# Patient Record
Sex: FEMALE | Race: BLACK OR AFRICAN AMERICAN | NOT HISPANIC OR LATINO | Employment: FULL TIME | URBAN - METROPOLITAN AREA
[De-identification: names, ages, dates, MRNs, and addresses within clinical notes are randomized per-mention and may not be internally consistent; named-entity substitution may affect disease eponyms.]

---

## 2018-01-29 ENCOUNTER — HOSPITAL ENCOUNTER (EMERGENCY)
Facility: OTHER | Age: 53
Discharge: HOME | End: 2018-01-29
Attending: EMERGENCY MEDICINE

## 2018-01-29 VITALS
HEART RATE: 68 BPM | OXYGEN SATURATION: 99 % | WEIGHT: 214 LBS | HEIGHT: 66 IN | TEMPERATURE: 99 F | RESPIRATION RATE: 14 BRPM | BODY MASS INDEX: 34.39 KG/M2 | SYSTOLIC BLOOD PRESSURE: 127 MMHG | DIASTOLIC BLOOD PRESSURE: 74 MMHG

## 2018-01-29 DIAGNOSIS — D64.9 ANEMIA, UNSPECIFIED TYPE: ICD-10-CM

## 2018-01-29 DIAGNOSIS — N93.8 DYSFUNCTIONAL UTERINE BLEEDING: Primary | ICD-10-CM

## 2018-01-29 DIAGNOSIS — E87.6 HYPOKALEMIA: ICD-10-CM

## 2018-01-29 LAB
ALBUMIN SERPL BCP-MCNC: 3.5 G/DL
ALP SERPL-CCNC: 82 U/L
ALT SERPL W/O P-5'-P-CCNC: 12 U/L
ANION GAP SERPL CALC-SCNC: 8 MMOL/L
AST SERPL-CCNC: 15 U/L
BASOPHILS # BLD AUTO: 0.03 K/UL
BASOPHILS NFR BLD: 0.3 %
BILIRUB SERPL-MCNC: 0.2 MG/DL
BILIRUB UR QL STRIP: NEGATIVE
BUN SERPL-MCNC: 11 MG/DL
CALCIUM SERPL-MCNC: 9.2 MG/DL
CHLORIDE SERPL-SCNC: 107 MMOL/L
CLARITY UR: CLEAR
CO2 SERPL-SCNC: 25 MMOL/L
COLOR UR: YELLOW
CREAT SERPL-MCNC: 0.7 MG/DL
DIFFERENTIAL METHOD: ABNORMAL
EOSINOPHIL # BLD AUTO: 0.1 K/UL
EOSINOPHIL NFR BLD: 1.1 %
ERYTHROCYTE [DISTWIDTH] IN BLOOD BY AUTOMATED COUNT: 18.4 %
EST. GFR  (AFRICAN AMERICAN): >60 ML/MIN/1.73 M^2
EST. GFR  (NON AFRICAN AMERICAN): >60 ML/MIN/1.73 M^2
GLUCOSE SERPL-MCNC: 96 MG/DL
GLUCOSE UR QL STRIP: NEGATIVE
HCT VFR BLD AUTO: 29.2 %
HGB BLD-MCNC: 9.3 G/DL
HGB UR QL STRIP: ABNORMAL
INR PPP: 1
KETONES UR QL STRIP: NEGATIVE
LEUKOCYTE ESTERASE UR QL STRIP: NEGATIVE
LYMPHOCYTES # BLD AUTO: 2.3 K/UL
LYMPHOCYTES NFR BLD: 22.8 %
MCH RBC QN AUTO: 25.3 PG
MCHC RBC AUTO-ENTMCNC: 31.8 G/DL
MCV RBC AUTO: 79 FL
MICROSCOPIC COMMENT: ABNORMAL
MONOCYTES # BLD AUTO: 0.8 K/UL
MONOCYTES NFR BLD: 7.5 %
NEUTROPHILS # BLD AUTO: 7 K/UL
NEUTROPHILS NFR BLD: 68 %
NITRITE UR QL STRIP: NEGATIVE
PH UR STRIP: 6 [PH] (ref 5–8)
PLATELET # BLD AUTO: 248 K/UL
PMV BLD AUTO: 10.5 FL
POTASSIUM SERPL-SCNC: 3.4 MMOL/L
PROT SERPL-MCNC: 7.2 G/DL
PROT UR QL STRIP: NEGATIVE
PROTHROMBIN TIME: 11 SEC
RBC # BLD AUTO: 3.68 M/UL
RBC #/AREA URNS HPF: 10 /HPF (ref 0–4)
SODIUM SERPL-SCNC: 140 MMOL/L
SP GR UR STRIP: 1.01 (ref 1–1.03)
URN SPEC COLLECT METH UR: ABNORMAL
UROBILINOGEN UR STRIP-ACNC: NEGATIVE EU/DL
WBC # BLD AUTO: 10.25 K/UL

## 2018-01-29 PROCEDURE — 80053 COMPREHEN METABOLIC PANEL: CPT

## 2018-01-29 PROCEDURE — 85610 PROTHROMBIN TIME: CPT

## 2018-01-29 PROCEDURE — 81000 URINALYSIS NONAUTO W/SCOPE: CPT

## 2018-01-29 PROCEDURE — 85025 COMPLETE CBC W/AUTO DIFF WBC: CPT

## 2018-01-29 PROCEDURE — 99283 EMERGENCY DEPT VISIT LOW MDM: CPT

## 2018-01-30 NOTE — ED TRIAGE NOTES
"Pt presents to the ED with c/o heavy vaginal bleeding x 11 days. Pt states she still gets a period but this is not her normal bleeding. Blood is dark red with clots. Pt denies any associated symptoms but also has c/o "bruising easily" and feeling like she has a rapid heart beat. Pt has hx of anemia and takes iron daily.   "

## 2018-01-30 NOTE — ED PROVIDER NOTES
"Encounter Date: 1/29/2018    SCRIBE #1 NOTE: I, Amber Marcos, am scribing for, and in the presence of, Dr. Hernández.       History     Chief Complaint   Patient presents with    Vaginal Bleeding     x 11 days. " It's like I have a cycle then I keep spotting inbetween and then my cycle comes again". Denies dizziness, weakness or fatigue     Time seen by provider: 7:16 PM    This is a 52 y.o. female, with history of anemia, fibroids, and acid reflux, who presents with complaint of vaginal bleeding for 12 days. She used four or five pads a day the first two days, two pads a day since then, and four pads today. LMP was 22 days ago. Her normal cycle lasts 8 days. She reports bruising on her legs, and takes iron TID. She reports intermittent tingling to the LLE for one week, which occurs when at rest and not wearing her shoe. She denies fever, chills, abdominal pain, diarrhea, constipation, incontinence, dizziness, weakness, fatigue, or any urinary symptoms. She has an appointment scheduled with Dr. Ponce (OB) on 3/05.      The history is provided by the patient.     Review of patient's allergies indicates:  No Known Allergies  Past Medical History:   Diagnosis Date    Acid reflux     Anemia      History reviewed. No pertinent surgical history.  History reviewed. No pertinent family history.  Social History   Substance Use Topics    Smoking status: Not on file    Smokeless tobacco: Not on file    Alcohol use Not on file     Review of Systems   Constitutional: Negative for chills, fatigue and fever.   HENT: Negative for congestion and sore throat.    Eyes: Negative for photophobia and redness.   Respiratory: Negative for cough and shortness of breath.    Cardiovascular: Negative for chest pain.   Gastrointestinal: Negative for abdominal pain, constipation, diarrhea, nausea and vomiting.        Negative for incontinence.   Genitourinary: Positive for vaginal bleeding. Negative for decreased urine volume, difficulty " urinating, dysuria, enuresis, frequency, hematuria and urgency.   Musculoskeletal: Negative for back pain.   Skin: Negative for rash.   Neurological: Negative for dizziness, weakness, light-headedness and headaches.        Positive for tingling of LLE.   Hematological: Bruises/bleeds easily.   Psychiatric/Behavioral: Negative for confusion.       Physical Exam     Initial Vitals [01/29/18 1807]   BP Pulse Resp Temp SpO2   (!) 138/111 81 16 98.4 °F (36.9 °C) 100 %      MAP       120         Physical Exam    Nursing note and vitals reviewed.  Constitutional: She appears well-developed and well-nourished. She is not diaphoretic. No distress.   HENT:   Head: Normocephalic and atraumatic.   Mouth/Throat: Oropharynx is clear and moist.   Eyes: Conjunctivae and EOM are normal. Pupils are equal, round, and reactive to light. No scleral icterus.   Neck: Normal range of motion. Neck supple.   Cardiovascular: Normal rate, regular rhythm, S1 normal, S2 normal and normal heart sounds. Exam reveals no gallop and no friction rub.    No murmur heard.  Pulses:       Dorsalis pedis pulses are 2+ on the right side, and 2+ on the left side.        Posterior tibial pulses are 2+ on the right side, and 2+ on the left side.   Pulmonary/Chest: Breath sounds normal. No respiratory distress. She has no wheezes. She has no rhonchi. She has no rales.   Abdominal: Soft. Bowel sounds are normal. There is no tenderness. There is no rebound and no guarding.   Musculoskeletal: Normal range of motion. She exhibits no edema or tenderness.   Bilateral Lower Extremity: No edema. No palpable cord.  Back: No midline C/T/L spine tenderness to palpation, crepitus, or step offs.   Lymphadenopathy:     She has no cervical adenopathy.   Neurological: She is alert and oriented to person, place, and time.   Cranial nerves II through XII grossly intact.  5/5 motor strength all 4 extremities.  Sensation is normal.  Finger to nose normal.  Speech and cognition is  normal.  No focal neurologic deficit.  Negative posterior drawer test.   Skin: Skin is warm and dry. Capillary refill takes less than 2 seconds. No rash noted. No pallor.   Psychiatric: She has a normal mood and affect. Her behavior is normal. Judgment and thought content normal.         ED Course   Procedures  Labs Reviewed   URINALYSIS - Abnormal; Notable for the following:        Result Value    Occult Blood UA 1+ (*)     All other components within normal limits   CBC W/ AUTO DIFFERENTIAL - Abnormal; Notable for the following:     RBC 3.68 (*)     Hemoglobin 9.3 (*)     Hematocrit 29.2 (*)     MCV 79 (*)     MCH 25.3 (*)     MCHC 31.8 (*)     RDW 18.4 (*)     All other components within normal limits   COMPREHENSIVE METABOLIC PANEL - Abnormal; Notable for the following:     Potassium 3.4 (*)     All other components within normal limits   URINALYSIS MICROSCOPIC - Abnormal; Notable for the following:     RBC, UA 10 (*)     All other components within normal limits   PROTIME-INR   POCT URINE PREGNANCY             Medical Decision Making:   Clinical Tests:   Lab Tests: Ordered and Reviewed            Scribe Attestation:   Scribe #1: I performed the above scribed service and the documentation accurately describes the services I performed. I attest to the accuracy of the note.    Attending Attestation:           Physician Attestation for Scribe:  Physician Attestation Statement for Scribe #1: I, Dr. Hernández, reviewed documentation, as scribed by Amber Arriaga in my presence, and it is both accurate and complete.         Attending ED Notes:   Emergent evaluation a 52-year-old female with chief complaint of her menstrual period lasting 12 days since of her typical 8 days.  Patient states she is going through approximately 2 pads a day over the past 5-6 days.  Patient is afebrile, nontoxic-appearing with stable vital signs.  Patient is no elevation of white blood cell count.  H&H 9.3 and 29.2.  Normal platelet count.   Potassium is 3.4.  Urinary analysis reveals 1+ blood.  The patient is extensively counseled her diagnosis and treatment including all diagnostic, laboratory and physical exam findings.  The patient discharged good condition and directed to follow-up with gynecology in the next 24-48 hours.          ED Course      Clinical Impression:     1. Dysfunctional uterine bleeding    2. Anemia, unspecified type    3. Hypokalemia                               Richi Carrasquillo MD  01/29/18 2041

## 2018-03-08 ENCOUNTER — OFFICE VISIT (OUTPATIENT)
Dept: OBSTETRICS AND GYNECOLOGY | Facility: CLINIC | Age: 53
End: 2018-03-08
Payer: COMMERCIAL

## 2018-03-08 VITALS
WEIGHT: 213.88 LBS | BODY MASS INDEX: 34.37 KG/M2 | HEIGHT: 66 IN | DIASTOLIC BLOOD PRESSURE: 60 MMHG | SYSTOLIC BLOOD PRESSURE: 110 MMHG

## 2018-03-08 DIAGNOSIS — Z12.31 VISIT FOR SCREENING MAMMOGRAM: ICD-10-CM

## 2018-03-08 DIAGNOSIS — Z11.3 SCREEN FOR STD (SEXUALLY TRANSMITTED DISEASE): ICD-10-CM

## 2018-03-08 DIAGNOSIS — N92.0 MENORRHAGIA WITH REGULAR CYCLE: ICD-10-CM

## 2018-03-08 DIAGNOSIS — D25.9 UTERINE LEIOMYOMA, UNSPECIFIED LOCATION: ICD-10-CM

## 2018-03-08 DIAGNOSIS — Z01.419 ENCOUNTER FOR GYNECOLOGICAL EXAMINATION WITHOUT ABNORMAL FINDING: Primary | ICD-10-CM

## 2018-03-08 DIAGNOSIS — D64.9 ANEMIA, UNSPECIFIED TYPE: ICD-10-CM

## 2018-03-08 LAB
B-HCG UR QL: NEGATIVE
CANDIDA RRNA VAG QL PROBE: NEGATIVE
CTP QC/QA: YES
G VAGINALIS RRNA GENITAL QL PROBE: POSITIVE
T VAGINALIS RRNA GENITAL QL PROBE: NEGATIVE

## 2018-03-08 PROCEDURE — 87491 CHLMYD TRACH DNA AMP PROBE: CPT

## 2018-03-08 PROCEDURE — 99999 PR PBB SHADOW E&M-EST. PATIENT-LVL III: CPT | Mod: PBBFAC,,, | Performed by: OBSTETRICS & GYNECOLOGY

## 2018-03-08 PROCEDURE — 88175 CYTOPATH C/V AUTO FLUID REDO: CPT

## 2018-03-08 PROCEDURE — 99386 PREV VISIT NEW AGE 40-64: CPT | Mod: S$GLB,,, | Performed by: OBSTETRICS & GYNECOLOGY

## 2018-03-08 PROCEDURE — 81025 URINE PREGNANCY TEST: CPT | Mod: S$GLB,,, | Performed by: OBSTETRICS & GYNECOLOGY

## 2018-03-08 PROCEDURE — 87480 CANDIDA DNA DIR PROBE: CPT

## 2018-03-08 RX ORDER — PREDNISONE 20 MG/1
TABLET ORAL
Refills: 0 | COMMUNITY
Start: 2017-12-21 | End: 2018-05-15 | Stop reason: CLARIF

## 2018-03-08 RX ORDER — TERCONAZOLE 4 MG/G
CREAM VAGINAL
Refills: 2 | COMMUNITY
Start: 2018-03-03 | End: 2018-05-15 | Stop reason: CLARIF

## 2018-03-08 RX ORDER — PROMETHAZINE HYDROCHLORIDE AND CODEINE PHOSPHATE 6.25; 1 MG/5ML; MG/5ML
SOLUTION ORAL
Refills: 0 | COMMUNITY
Start: 2017-12-21 | End: 2018-05-15 | Stop reason: CLARIF

## 2018-03-08 RX ORDER — ONDANSETRON 4 MG/1
TABLET, ORALLY DISINTEGRATING ORAL
Refills: 0 | COMMUNITY
Start: 2017-12-21 | End: 2018-05-15 | Stop reason: CLARIF

## 2018-03-08 RX ORDER — BROMPHENIRAMINE MALEATE, PSEUDOEPHEDRINE HYDROCHLORIDE, AND DEXTROMETHORPHAN HYDROBROMIDE 2; 30; 10 MG/5ML; MG/5ML; MG/5ML
SYRUP ORAL
Refills: 0 | COMMUNITY
Start: 2017-12-21 | End: 2018-05-15 | Stop reason: CLARIF

## 2018-03-08 RX ORDER — CLINDAMYCIN HYDROCHLORIDE 300 MG/1
300 CAPSULE ORAL 3 TIMES DAILY
Refills: 0 | COMMUNITY
Start: 2017-12-19 | End: 2018-05-15 | Stop reason: CLARIF

## 2018-03-08 NOTE — PROGRESS NOTES
HISTORY OF PRESENT ILLNESS:    Karli Samuel is a 52 y.o. female, , Patient's last menstrual period was 2018.,  presents for a routine exam and has no complaints.    Past Medical History:   Diagnosis Date    Acid reflux     Anemia        History reviewed. No pertinent surgical history.    MEDICATIONS AND ALLERGIES:      Current Outpatient Prescriptions:     FERROUS SULFATE (IRON ORAL), Take by mouth., Disp: , Rfl:     predniSONE (DELTASONE) 20 MG tablet, TK 2 TS PO QD FOR 5 DAYS, Disp: , Rfl: 0    brompheniramine-pseudoeph-DM 2-30-10 mg/5 mL Syrp, TK 10ML PO   Q 4 H, Disp: , Rfl: 0    clindamycin (CLEOCIN) 300 MG capsule, Take 300 mg by mouth 3 (three) times daily., Disp: , Rfl: 0    FAMOTIDINE (PEPCID ORAL), Take by mouth., Disp: , Rfl:     ondansetron (ZOFRAN-ODT) 4 MG TbDL, DISSOLVE 1 T PO  Q 4 H, Disp: , Rfl: 0    promethazine-codeine 6.25-10 mg/5 ml (PHENERGAN WITH CODEINE) 6.25-10 mg/5 mL syrup, TK 5ML PO Q  4 TO 6 H, Disp: , Rfl: 0    terconazole (TERAZOL 7) 0.4 % Crea, INSERT 1 APPLICATORFUL INTO VAGINA EVERY NIGHT FOR 7 NIGHTS, Disp: , Rfl: 2    Review of patient's allergies indicates:   Allergen Reactions    Pcn [penicillins] Anaphylaxis       History reviewed. No pertinent family history.    Social History     Social History    Marital status: Single     Spouse name: N/A    Number of children: N/A    Years of education: N/A     Occupational History    Not on file.     Social History Main Topics    Smoking status: Never Smoker    Smokeless tobacco: Never Used    Alcohol use Not on file    Drug use: No    Sexual activity: Yes     Partners: Male     Other Topics Concern    Not on file     Social History Narrative    No narrative on file       COMPREHENSIVE GYN HISTORY:  PAP History: Denies abnormal Paps.  Infection History: Denies STDs. Denies PID.  Benign History: Denies uterine fibroids. Denies ovarian cysts. Denies endometriosis. Denies other conditions.  Cancer History:  "Denies cervical cancer. Denies uterine cancer or hyperplasia. Denies ovarian cancer. Denies vulvar cancer or pre-cancer. Denies vaginal cancer or pre-cancer. Denies breast cancer. Denies colon cancer.  Sexual Activity History: Reports currently being sexually active  Menstrual History: Monthly. Mod then light flow.   Dysmenorrhea History: Reports mild dysmenorrhea.       ROS:  GENERAL: No weight changes. No swelling. No fatigue. No fever.  CARDIOVASCULAR: No chest pain. No shortness of breath. No leg cramps.   NEUROLOGICAL: No headaches. No vision changes.  BREASTS: No pain. No lumps. No discharge.  ABDOMEN: No pain. No nausea. No vomiting. No diarrhea. No constipation.  REPRODUCTIVE: No abnormal bleeding.   VULVA: No pain. No lesions. No itching.  VAGINA: No relaxation. No itching. No odor. No discharge. No lesions.  URINARY: No incontinence. No nocturia. No frequency. No dysuria.    /60   Ht 5' 6" (1.676 m)   Wt 97 kg (213 lb 13.5 oz)   LMP 02/21/2018   BMI 34.52 kg/m²     PE:  APPEARANCE: Well nourished, well developed, in no acute distress.  AFFECT: WNL, alert and oriented x 3.  SKIN: No acne or hirsutism.  NECK: Neck symmetric, without masses or thyromegaly.  NODES: No inguinal, cervical, axillary or femoral lymph node enlargement.  CHEST: Good respiratory effort.   ABDOMEN: Soft. No tenderness or masses. No hepatosplenomegaly. No hernias.  BREASTS: Symmetrical, no skin changes, visible lesions, palpable masses or nipple discharge bilaterally.  PELVIC: External female genitalia without lesions.  Female hair distribution. Adequate perineal body, Normal urethral meatus. Vagina moist and well rugated without lesions or discharge.  No significant cystocele or rectocele present. Cervix pink without lesions, discharge or tenderness. Uterus is 4-6 week size, regular, mobile and nontender. Adnexa without masses or tenderness.  EXTREMITIES: No edema    DIAGNOSIS:  1. Encounter for gynecological examination " without abnormal finding    2. Visit for screening mammogram    3. Uterine leiomyoma, unspecified location    4. Menorrhagia with regular cycle    5. Anemia, unspecified type        PLAN:    Orders Placed This Encounter    Mammo Digital Diagnostic Bilat with CAD    US Pelvis Comp with Transvag NON-OB (xpd    Liquid-based pap smear, screening       COUNSELING:  The patient was counseled today on:  -A.C.S. Pap and pelvic exam guidelines (pap every 3 years), recomendations for yearly mammogram;  -to follow up with her PCP for other health maintenance.    FOLLOW-UP with me annually.

## 2018-03-08 NOTE — PROGRESS NOTES
HISTORY OF PRESENT ILLNESS:    Karli Samuel is a 52 y.o. female, , Patient's last menstrual period was 2018.,  presents for a routine exam   Heavy menses every danae lasting 11 days using 6 pads per day with clotting.   Recent Terazol use with continuesd irratiation     Past Medical History:   Diagnosis Date    Acid reflux     Anemia        History reviewed. No pertinent surgical history.    MEDICATIONS AND ALLERGIES:      Current Outpatient Prescriptions:     FERROUS SULFATE (IRON ORAL), Take by mouth., Disp: , Rfl:     predniSONE (DELTASONE) 20 MG tablet, TK 2 TS PO QD FOR 5 DAYS, Disp: , Rfl: 0    brompheniramine-pseudoeph-DM 2-30-10 mg/5 mL Syrp, TK 10ML PO   Q 4 H, Disp: , Rfl: 0    clindamycin (CLEOCIN) 300 MG capsule, Take 300 mg by mouth 3 (three) times daily., Disp: , Rfl: 0    FAMOTIDINE (PEPCID ORAL), Take by mouth., Disp: , Rfl:     ondansetron (ZOFRAN-ODT) 4 MG TbDL, DISSOLVE 1 T PO  Q 4 H, Disp: , Rfl: 0    promethazine-codeine 6.25-10 mg/5 ml (PHENERGAN WITH CODEINE) 6.25-10 mg/5 mL syrup, TK 5ML PO Q  4 TO 6 H, Disp: , Rfl: 0    terconazole (TERAZOL 7) 0.4 % Crea, INSERT 1 APPLICATORFUL INTO VAGINA EVERY NIGHT FOR 7 NIGHTS, Disp: , Rfl: 2    Review of patient's allergies indicates:   Allergen Reactions    Pcn [penicillins] Anaphylaxis       History reviewed. No pertinent family history.    Social History     Social History    Marital status: Single     Spouse name: N/A    Number of children: N/A    Years of education: N/A     Occupational History    Not on file.     Social History Main Topics    Smoking status: Never Smoker    Smokeless tobacco: Never Used    Alcohol use Not on file    Drug use: No    Sexual activity: Yes     Partners: Male     Other Topics Concern    Not on file     Social History Narrative    No narrative on file       COMPREHENSIVE GYN HISTORY:  PAP History: Denies abnormal Paps.  Infection History: Denies STDs. Denies PID.  Benign History: Denies  "uterine fibroids. Denies ovarian cysts. Denies endometriosis. Denies other conditions.  Cancer History: Denies cervical cancer. Denies uterine cancer or hyperplasia. Denies ovarian cancer. Denies vulvar cancer or pre-cancer. Denies vaginal cancer or pre-cancer. Denies breast cancer. Denies colon cancer.  Sexual Activity History: Reports currently being sexually active  Menstrual History: Monthly. Mod then light flow.   Dysmenorrhea History: Reports mild dysmenorrhea.       ROS:  GENERAL: No weight changes. No swelling. No fatigue. No fever.  CARDIOVASCULAR: No chest pain. No shortness of breath. No leg cramps.   NEUROLOGICAL: No headaches. No vision changes.  BREASTS: No pain. No lumps. No discharge.  ABDOMEN: No pain. No nausea. No vomiting. No diarrhea. No constipation.  REPRODUCTIVE: No abnormal bleeding.   VULVA: No pain. No lesions. No itching.  VAGINA: No relaxation. No itching. No odor. No discharge. No lesions.  URINARY: No incontinence. No nocturia. No frequency. No dysuria.    /60   Ht 5' 6" (1.676 m)   Wt 97 kg (213 lb 13.5 oz)   LMP 02/21/2018   BMI 34.52 kg/m²     PE:  APPEARANCE: Well nourished, well developed, in no acute distress.  AFFECT: WNL, alert and oriented x 3.  SKIN: No acne or hirsutism.  NECK: Neck symmetric, without masses or thyromegaly.  NODES: No inguinal, cervical, axillary or femoral lymph node enlargement.  CHEST: Good respiratory effort.   ABDOMEN: Soft. No tenderness or masses. No hepatosplenomegaly. No hernias.  BREASTS: Symmetrical, no skin changes, visible lesions, palpable masses or nipple discharge bilaterally.  PELVIC: External female genitalia without lesions.  Female hair distribution. Adequate perineal body, Normal urethral meatus. Vagina moist and well rugated without lesions or discharge.  No significant cystocele or rectocele present. Cervix pink without lesions, discharge or tenderness. Uterus is 14-16 week size, regular, mobile and nontender. Adnexa without " masses or tenderness.  EXTREMITIES: No edema    DIAGNOSIS:  1. Encounter for gynecological examination without abnormal finding    2. Visit for screening mammogram    3. Uterine leiomyoma, unspecified location    4. Menorrhagia with regular cycle    5. Anemia, unspecified type        PLAN:    Orders Placed This Encounter    Mammo Digital Diagnostic Bilat with CAD    US Pelvis Comp with Transvag NON-OB (xpd    Liquid-based pap smear, screening       COUNSELING:  The patient was counseled today on:  -A.C.S. Pap and pelvic exam guidelines (pap every 3 years), recomendations for yearly mammogram;  -to follow up with her PCP for other health maintenance.    FOLLOW-UP with me for EMBX

## 2018-03-09 ENCOUNTER — TELEPHONE (OUTPATIENT)
Dept: OBSTETRICS AND GYNECOLOGY | Facility: CLINIC | Age: 53
End: 2018-03-09

## 2018-03-09 DIAGNOSIS — B96.89 BV (BACTERIAL VAGINOSIS): Primary | ICD-10-CM

## 2018-03-09 DIAGNOSIS — N76.0 BV (BACTERIAL VAGINOSIS): Primary | ICD-10-CM

## 2018-03-09 LAB
C TRACH DNA SPEC QL NAA+PROBE: NOT DETECTED
N GONORRHOEA DNA SPEC QL NAA+PROBE: NOT DETECTED

## 2018-03-09 RX ORDER — METRONIDAZOLE 7.5 MG/G
1 GEL VAGINAL DAILY
Qty: 1 G | Refills: 0 | Status: SHIPPED | OUTPATIENT
Start: 2018-03-09 | End: 2018-03-16

## 2018-03-09 NOTE — TELEPHONE ENCOUNTER
I spoke to the pt and informed her that she has a over growth of bacteria and she  Has medicine at the pharmacy for  per Dr Ponce.

## 2018-03-19 ENCOUNTER — HOSPITAL ENCOUNTER (OUTPATIENT)
Dept: RADIOLOGY | Facility: OTHER | Age: 53
Discharge: HOME | End: 2018-03-19
Attending: OBSTETRICS & GYNECOLOGY
Payer: COMMERCIAL

## 2018-03-19 DIAGNOSIS — N92.0 MENORRHAGIA WITH REGULAR CYCLE: ICD-10-CM

## 2018-03-19 DIAGNOSIS — Z12.31 VISIT FOR SCREENING MAMMOGRAM: ICD-10-CM

## 2018-03-19 DIAGNOSIS — D64.9 ANEMIA, UNSPECIFIED TYPE: ICD-10-CM

## 2018-03-19 PROCEDURE — 76856 US EXAM PELVIC COMPLETE: CPT | Mod: 26,,, | Performed by: RADIOLOGY

## 2018-03-19 PROCEDURE — 76830 TRANSVAGINAL US NON-OB: CPT | Mod: 26,,, | Performed by: RADIOLOGY

## 2018-03-19 PROCEDURE — 77067 SCR MAMMO BI INCL CAD: CPT | Mod: 26,,, | Performed by: RADIOLOGY

## 2018-03-19 PROCEDURE — 77067 SCR MAMMO BI INCL CAD: CPT | Mod: TC

## 2018-03-19 PROCEDURE — 76830 TRANSVAGINAL US NON-OB: CPT | Mod: TC

## 2018-03-19 PROCEDURE — 77063 BREAST TOMOSYNTHESIS BI: CPT | Mod: 26,,, | Performed by: RADIOLOGY

## 2018-04-05 ENCOUNTER — PROCEDURE VISIT (OUTPATIENT)
Dept: OBSTETRICS AND GYNECOLOGY | Facility: CLINIC | Age: 53
End: 2018-04-05
Payer: COMMERCIAL

## 2018-04-05 VITALS
DIASTOLIC BLOOD PRESSURE: 80 MMHG | BODY MASS INDEX: 35.43 KG/M2 | HEIGHT: 66 IN | WEIGHT: 220.44 LBS | SYSTOLIC BLOOD PRESSURE: 120 MMHG

## 2018-04-05 DIAGNOSIS — N93.8 DUB (DYSFUNCTIONAL UTERINE BLEEDING): Primary | ICD-10-CM

## 2018-04-05 DIAGNOSIS — N89.8 VAGINAL DISCHARGE: ICD-10-CM

## 2018-04-05 LAB
B-HCG UR QL: NEGATIVE
CTP QC/QA: YES

## 2018-04-05 PROCEDURE — 87480 CANDIDA DNA DIR PROBE: CPT

## 2018-04-05 PROCEDURE — 81025 URINE PREGNANCY TEST: CPT | Mod: S$GLB,,, | Performed by: OBSTETRICS & GYNECOLOGY

## 2018-04-05 PROCEDURE — 87510 GARDNER VAG DNA DIR PROBE: CPT

## 2018-04-06 ENCOUNTER — PATIENT MESSAGE (OUTPATIENT)
Dept: OBSTETRICS AND GYNECOLOGY | Facility: CLINIC | Age: 53
End: 2018-04-06

## 2018-04-06 ENCOUNTER — TELEPHONE (OUTPATIENT)
Dept: OBSTETRICS AND GYNECOLOGY | Facility: CLINIC | Age: 53
End: 2018-04-06

## 2018-04-06 DIAGNOSIS — B96.89 BV (BACTERIAL VAGINOSIS): Primary | ICD-10-CM

## 2018-04-06 DIAGNOSIS — N76.0 BV (BACTERIAL VAGINOSIS): Primary | ICD-10-CM

## 2018-04-06 LAB
CANDIDA RRNA VAG QL PROBE: NEGATIVE
G VAGINALIS RRNA GENITAL QL PROBE: POSITIVE
T VAGINALIS RRNA GENITAL QL PROBE: NEGATIVE

## 2018-04-06 RX ORDER — METRONIDAZOLE 7.5 MG/G
1 GEL VAGINAL DAILY
Qty: 1 G | Refills: 0 | Status: SHIPPED | OUTPATIENT
Start: 2018-04-06 | End: 2018-04-13

## 2018-04-26 ENCOUNTER — PROCEDURE VISIT (OUTPATIENT)
Dept: OBSTETRICS AND GYNECOLOGY | Facility: CLINIC | Age: 53
End: 2018-04-26
Payer: COMMERCIAL

## 2018-04-26 VITALS
SYSTOLIC BLOOD PRESSURE: 110 MMHG | WEIGHT: 212 LBS | DIASTOLIC BLOOD PRESSURE: 70 MMHG | BODY MASS INDEX: 34.07 KG/M2 | HEIGHT: 66 IN

## 2018-04-26 DIAGNOSIS — D25.9 UTERINE LEIOMYOMA, UNSPECIFIED LOCATION: ICD-10-CM

## 2018-04-26 DIAGNOSIS — N93.8 DUB (DYSFUNCTIONAL UTERINE BLEEDING): Primary | ICD-10-CM

## 2018-04-26 LAB
B-HCG UR QL: NEGATIVE
CTP QC/QA: YES

## 2018-04-26 PROCEDURE — 88305 TISSUE EXAM BY PATHOLOGIST: CPT | Mod: 26,,,

## 2018-04-26 PROCEDURE — 81025 URINE PREGNANCY TEST: CPT | Mod: S$GLB,,, | Performed by: OBSTETRICS & GYNECOLOGY

## 2018-04-26 PROCEDURE — 88305 TISSUE EXAM BY PATHOLOGIST: CPT

## 2018-04-26 PROCEDURE — 58100 BIOPSY OF UTERUS LINING: CPT | Mod: S$GLB,,, | Performed by: OBSTETRICS & GYNECOLOGY

## 2018-04-26 RX ORDER — MELOXICAM 7.5 MG/1
TABLET ORAL
Refills: 1 | COMMUNITY
Start: 2018-04-05 | End: 2018-05-15 | Stop reason: CLARIF

## 2018-04-26 NOTE — PROCEDURES
Biopsy (Gynecological)  Date/Time: 4/26/2018 12:34 PM  Performed by: ELMO COUGHLIN  Authorized by: ELMO COUGHLIN     Consent Done?:  Yes (Written)   Patient was prepped and draped in the normal sterile fashion.  Local anesthesia used?: No      Biopsy Location:  Uterus  Estimated blood loss (cc):  10   Patient tolerated the procedure well with no immediate complications.     Pelvic rest for 2 weeks. Bleeding, pain and fever precautions given.

## 2018-05-15 ENCOUNTER — TELEPHONE (OUTPATIENT)
Dept: OBSTETRICS AND GYNECOLOGY | Facility: CLINIC | Age: 53
End: 2018-05-15

## 2018-05-15 ENCOUNTER — ANESTHESIA EVENT (OUTPATIENT)
Dept: SURGERY | Facility: OTHER | Age: 53
End: 2018-05-15
Payer: COMMERCIAL

## 2018-05-15 ENCOUNTER — HOSPITAL ENCOUNTER (OUTPATIENT)
Dept: PREADMISSION TESTING | Facility: OTHER | Age: 53
Discharge: HOME | End: 2018-05-15
Attending: OBSTETRICS & GYNECOLOGY
Payer: COMMERCIAL

## 2018-05-15 ENCOUNTER — OFFICE VISIT (OUTPATIENT)
Dept: OBSTETRICS AND GYNECOLOGY | Facility: CLINIC | Age: 53
End: 2018-05-15
Payer: COMMERCIAL

## 2018-05-15 VITALS
WEIGHT: 222 LBS | BODY MASS INDEX: 35.68 KG/M2 | HEIGHT: 66 IN | DIASTOLIC BLOOD PRESSURE: 88 MMHG | SYSTOLIC BLOOD PRESSURE: 132 MMHG

## 2018-05-15 VITALS
DIASTOLIC BLOOD PRESSURE: 79 MMHG | HEIGHT: 66 IN | SYSTOLIC BLOOD PRESSURE: 130 MMHG | TEMPERATURE: 98 F | OXYGEN SATURATION: 99 % | BODY MASS INDEX: 35.68 KG/M2 | WEIGHT: 222 LBS | HEART RATE: 69 BPM

## 2018-05-15 DIAGNOSIS — D64.9 ANEMIA, UNSPECIFIED TYPE: ICD-10-CM

## 2018-05-15 DIAGNOSIS — Z87.42 HISTORY OF VAGINITIS: Primary | ICD-10-CM

## 2018-05-15 DIAGNOSIS — D25.9 UTERINE LEIOMYOMA, UNSPECIFIED LOCATION: ICD-10-CM

## 2018-05-15 DIAGNOSIS — Z01.818 PRE-OP EXAMINATION: ICD-10-CM

## 2018-05-15 DIAGNOSIS — N92.0 MENORRHAGIA WITH REGULAR CYCLE: ICD-10-CM

## 2018-05-15 LAB
ABO + RH BLD: NORMAL
ANION GAP SERPL CALC-SCNC: 10 MMOL/L
BLD GP AB SCN CELLS X3 SERPL QL: NORMAL
BUN SERPL-MCNC: 7 MG/DL
CALCIUM SERPL-MCNC: 8.9 MG/DL
CANDIDA RRNA VAG QL PROBE: NEGATIVE
CHLORIDE SERPL-SCNC: 108 MMOL/L
CO2 SERPL-SCNC: 23 MMOL/L
CREAT SERPL-MCNC: 0.6 MG/DL
EST. GFR  (AFRICAN AMERICAN): >60 ML/MIN/1.73 M^2
EST. GFR  (NON AFRICAN AMERICAN): >60 ML/MIN/1.73 M^2
G VAGINALIS RRNA GENITAL QL PROBE: POSITIVE
GLUCOSE SERPL-MCNC: 84 MG/DL
POTASSIUM SERPL-SCNC: 3.7 MMOL/L
SODIUM SERPL-SCNC: 141 MMOL/L
T VAGINALIS RRNA GENITAL QL PROBE: NEGATIVE

## 2018-05-15 PROCEDURE — 87480 CANDIDA DNA DIR PROBE: CPT

## 2018-05-15 PROCEDURE — 87510 GARDNER VAG DNA DIR PROBE: CPT

## 2018-05-15 PROCEDURE — 3008F BODY MASS INDEX DOCD: CPT | Mod: CPTII,S$GLB,, | Performed by: OBSTETRICS & GYNECOLOGY

## 2018-05-15 PROCEDURE — 86850 RBC ANTIBODY SCREEN: CPT

## 2018-05-15 PROCEDURE — 87491 CHLMYD TRACH DNA AMP PROBE: CPT

## 2018-05-15 PROCEDURE — 80048 BASIC METABOLIC PNL TOTAL CA: CPT

## 2018-05-15 PROCEDURE — 93010 ELECTROCARDIOGRAM REPORT: CPT | Mod: ,,, | Performed by: INTERNAL MEDICINE

## 2018-05-15 PROCEDURE — 93005 ELECTROCARDIOGRAM TRACING: CPT

## 2018-05-15 PROCEDURE — 99999 PR PBB SHADOW E&M-EST. PATIENT-LVL II: CPT | Mod: PBBFAC,,, | Performed by: OBSTETRICS & GYNECOLOGY

## 2018-05-15 PROCEDURE — 99213 OFFICE O/P EST LOW 20 MIN: CPT | Mod: S$GLB,,, | Performed by: OBSTETRICS & GYNECOLOGY

## 2018-05-15 RX ORDER — LIDOCAINE HYDROCHLORIDE 10 MG/ML
0.5 INJECTION, SOLUTION EPIDURAL; INFILTRATION; INTRACAUDAL; PERINEURAL ONCE
Status: CANCELLED | OUTPATIENT
Start: 2018-05-15 | End: 2018-05-15

## 2018-05-15 RX ORDER — SODIUM CHLORIDE, SODIUM LACTATE, POTASSIUM CHLORIDE, CALCIUM CHLORIDE 600; 310; 30; 20 MG/100ML; MG/100ML; MG/100ML; MG/100ML
INJECTION, SOLUTION INTRAVENOUS CONTINUOUS
Status: CANCELLED | OUTPATIENT
Start: 2018-05-15

## 2018-05-15 RX ORDER — MIDAZOLAM HYDROCHLORIDE 5 MG/ML
5 INJECTION INTRAMUSCULAR; INTRAVENOUS
Status: DISCONTINUED | OUTPATIENT
Start: 2018-05-25 | End: 2018-05-16 | Stop reason: HOSPADM

## 2018-05-15 NOTE — ANESTHESIA PREPROCEDURE EVALUATION
05/15/2018  Karli Samuel is a 53 y.o., female.    Anesthesia Evaluation    I have reviewed the Patient Summary Reports.    I have reviewed the Nursing Notes.   I have reviewed the Medications.     Review of Systems  Anesthesia Hx:  Denies Family Hx of Anesthesia complications.   Denies Personal Hx of Anesthesia complications.   Hematology/Oncology:     Oncology Normal    -- Anemia: Hematology Comments: H/h 9/29    Cardiovascular:   Exercise tolerance: good Valvular problems/Murmurs (H/O Palpitations, no meds)    Renal/:  Renal/ Normal     Hepatic/GI:   GERD    Neurological:  Neurology Normal    Endocrine:  Endocrine Normal        Physical Exam  General:  Well nourished, Obesity    Airway/Jaw/Neck:  Airway Findings: Mouth Opening: Normal Tongue: Normal  General Airway Assessment: Adult  Mallampati: II  TM Distance: Normal, at least 6 cm      Dental:  Dental Findings: In tact, upper front caps        Mental Status:  Mental Status Findings:  Alert and Oriented, Cooperative         Anesthesia Plan  Type of Anesthesia, risks & benefits discussed:  Anesthesia Type:  general  Patient's Preference:   Intra-op Monitoring Plan: standard ASA monitors  Intra-op Monitoring Plan Comments:   Post Op Pain Control Plan: multimodal analgesia  Post Op Pain Control Plan Comments:   Induction:   IV  Beta Blocker:         Informed Consent: Patient understands risks and agrees with Anesthesia plan.  Questions answered. Anesthesia consent signed with patient.  ASA Score: 2     Day of Surgery Review of History & Physical:    H&P update referred to the surgeon.     Anesthesia Plan Notes: Pt saw cardiology at [facility] about one month ago for pre-op eval.  No issues.  Will order labs and EKG        Ready For Surgery From Anesthesia Perspective.

## 2018-05-15 NOTE — TELEPHONE ENCOUNTER
Lucero, this is Kenzie I've received your request I'm returning your call from the [facility] at [facility] [place] by [facility]. I just wanted to let you know that I'm working on getting an answer for you by today.  I left a message for the pt to call the office back to inform her that she does not have to have a blood transfusion before surgery .

## 2018-05-15 NOTE — PROGRESS NOTES
HISTORY OF PRESENT ILLNESS:    Karli Samuel is a 53 y.o. female, , Patient's last menstrual period was 2018.,  presents for a pre-op exam for TLH/BS with ovarian conservation scheduled on 2018.      Patient presents with a long history of menorrhagia and pelvic pain. Heavy menses every month lasting 11 days using 6 pads per day with clotting.    Patient counseled in detail on options available for menorrhagia and uterine fibroids. Medical therapy and surgical options discussed in detail. Patient desires to proceed with definitive therapy with hysterectomy. A discussion was also held in reference to ovarian hormone production, a woman's lifetime risk of developing ovarian cancer and the possible need for reoperation for ovarian pathology. She desires NOT to have ovaries removed at the time of surgery.    Long discussion held patient today concerning her surgery, hospital course on the day of surgery and post operative course. Precautions after surgery discussed in detail.  Risks, alternatives and benefits of surgery discussed with patient in detail and consent explained in detail. Questions were answered and patient voices understanding. Plan pre-op with Anesthesia at [facility].      Pelvic US:   HISTORY:  Menorrhagia with regular cycle. Patient is pre-menopausal, age of 52.    TECHNIQUE: Transabdominal and transvaginal ultrasound of the pelvis with color flow Doppler evaluation of the ovaries.    COMPARISON: None.    FINDINGS:   Uterus:        Size: 13 x 4 x 5.8 x 6.8 cm         Appearance: Heterogeneous with multiple, at least 5 fibroids. The largest measures 4.0 cm along the posterior uterine body. Fibroids distort and mildly vascular the endometrium. No pedunculated fibroids.       Endometrial stripe: 5 mm    Right ovary: Not able to be visualized.    Left ovary: Not able to be visualized.    Other: No free fluid.  Impression: Multiple uterine fibroids. Nonvisualization of  the ovaries.    Electronically signed by: GRECIA ASHRAF  Date: 03/19/18 Time: 09:32     Past Medical History:   Diagnosis Date    Acid reflux     Anemia      History reviewed. No pertinent surgical history.    MEDICATIONS AND ALLERGIES:      Current Outpatient Prescriptions:     FERROUS SULFATE (IRON ORAL), Take by mouth., Disp: , Rfl:     brompheniramine-pseudoeph-DM 2-30-10 mg/5 mL Syrp, TK 10ML PO   Q 4 H, Disp: , Rfl: 0    clindamycin (CLEOCIN) 300 MG capsule, Take 300 mg by mouth 3 (three) times daily., Disp: , Rfl: 0    FAMOTIDINE (PEPCID ORAL), Take by mouth., Disp: , Rfl:     meloxicam (MOBIC) 7.5 MG tablet, TK 1 T PO BID, Disp: , Rfl: 1    ondansetron (ZOFRAN-ODT) 4 MG TbDL, DISSOLVE 1 T PO  Q 4 H, Disp: , Rfl: 0    predniSONE (DELTASONE) 20 MG tablet, TK 2 TS PO QD FOR 5 DAYS, Disp: , Rfl: 0    promethazine-codeine 6.25-10 mg/5 ml (PHENERGAN WITH CODEINE) 6.25-10 mg/5 mL syrup, TK 5ML PO Q  4 TO 6 H, Disp: , Rfl: 0    terconazole (TERAZOL 7) 0.4 % Crea, INSERT 1 APPLICATORFUL INTO VAGINA EVERY NIGHT FOR 7 NIGHTS, Disp: , Rfl: 2    Review of patient's allergies indicates:   Allergen Reactions    Pcn [penicillins] Anaphylaxis       History reviewed. No pertinent family history.    Social History     Social History    Marital status: Single     Spouse name: N/A    Number of children: N/A    Years of education: N/A     Occupational History    Not on file.     Social History Main Topics    Smoking status: Never Smoker    Smokeless tobacco: Never Used    Alcohol use Not on file    Drug use: No    Sexual activity: Yes     Partners: Male     Other Topics Concern    Not on file     Social History Narrative    No narrative on file       COMPREHENSIVE GYN HISTORY:  PAP History: Denies abnormal Paps.  Infection History: Denies STDs. Denies PID.  Benign History: Denies uterine fibroids. Denies ovarian cysts. Denies endometriosis. Denies other conditions.  Cancer History: Denies cervical cancer.  "Denies uterine cancer or hyperplasia. Denies ovarian cancer. Denies vulvar cancer or pre-cancer. Denies vaginal cancer or pre-cancer. Denies breast cancer. Denies colon cancer.  Sexual Activity History: Reports currently being sexually active  Menstrual History: Monthly. Mod then light flow.   Dysmenorrhea History: Reports mild dysmenorrhea.     ROS:  GENERAL: No weight changes. No swelling. No fatigue. No fever.  CARDIOVASCULAR: No chest pain. No shortness of breath. No leg cramps.   NEUROLOGICAL: No headaches. No vision changes.  BREASTS: No pain. No lumps. No discharge.  ABDOMEN: No pain. No nausea. No vomiting. No diarrhea. No constipation.  REPRODUCTIVE: No abnormal bleeding.   VULVA: No pain. No lesions. No itching.  VAGINA: No relaxation. No itching. No odor. No discharge. No lesions.  URINARY: No incontinence. No nocturia. No frequency. No dysuria.    /88   Ht 5' 6" (1.676 m)   Wt 100.7 kg (222 lb 0.1 oz)   LMP 05/06/2018   BMI 35.83 kg/m²     PE:  APPEARANCE: Well nourished, well developed, in no acute distress.  AFFECT: WNL, alert and oriented x 3.  SKIN: No acne or hirsutism.  NECK: Neck symmetric, without masses or thyromegaly.  NODES: No inguinal, cervical, axillary or femoral lymph node enlargement.  CHEST: Good respiratory effort.   ABDOMEN: Soft. No tenderness or masses. No hepatosplenomegaly. No hernias.  BREASTS: Symmetrical, no skin changes, visible lesions, palpable masses or nipple discharge bilaterally.  PELVIC: External female genitalia without lesions.  Female hair distribution. Adequate perineal body, Normal urethral meatus. Vagina moist and well rugated without lesions or discharge.  No significant cystocele or rectocele present. Cervix pink without lesions, discharge or tenderness. Uterus is 12 week size, regular, mobile and nontender. Adnexa without masses or tenderness.  EXTREMITIES: No edema    DIAGNOSIS:  1. History of vaginitis    2. Menorrhagia with regular cycle    3. " Uterine leiomyoma, unspecified location    4. Anemia, unspecified type      PLAN:    Orders Placed This Encounter    Vaginosis Screen by DNA Probe    C. trachomatis/N. gonorrhoeae by AMP DNA Cervix    C. trachomatis/N. gonorrhoeae by AMP DNA Cervix    Vaginosis Screen by DNA Probe    CBC auto differential     LABS AND TESTS ORDERED:  Pre-op orders and T&S ordered    COUNSELING:  Post op counseling performed, questions answered    Plan TLH/BS with ovarian conservation/Cystoscopy on 5/25/2018.     FOLLOW-UP with me for post op visit.

## 2018-05-15 NOTE — DISCHARGE INSTRUCTIONS
PRE-ADMIT TESTING -  [phone]    [address]  [facility]          Your surgery has been scheduled at [facility]. We are pleased to have the opportunity to serve you. For Further Information please call [phone].    On the day of surgery please report to the Information Desk on the 1st floor.    · CONTACT YOUR PHYSICIAN'S OFFICE THE DAY PRIOR TO YOUR SURGERY TO OBTAIN YOUR ARRIVAL TIME.     · The evening before surgery do not eat anything after 9 p.m. ( this includes hard candy, chewing gum and mints).  You may only have GATORADE, POWERADE AND WATER  from 9 p.m. until you leave your home.   DO NOT DRINK ANY LIQUIDS ON THE WAY TO THE HOSPITAL.      SPECIAL MEDICATION INSTRUCTIONS: TAKE medications checked off by the Anesthesiologist on your Medication List.    Angiogram Patients: Take medications as instructed by your physician, including aspirin.     Surgery Patients:    If you take ASPIRIN - Your PHYSICIAN/SURGEON will need to inform you IF/OR when you need to stop taking aspirin prior to your surgery.     Do Not take any medications containing IBUPROFEN.  Do Not Wear any make-up or dark nail polish   (especially eye make-up) to surgery. If you come to surgery with makeup on you will be required to remove the makeup or nail polish.    Do not shave your surgical area at least 5 days prior to your surgery. The surgical prep will be performed at the hospital according to Infection Control regulations.    Leave all valuables at home.   Do Not wear any jewelry or watches, including any metal in body piercings.  Contact Lens must be removed before surgery. Either do not wear the contact lens or bring a case and solution for storage.  Please bring a container for eyeglasses or dentures as required.  Bring any paperwork your physician has provided, such as consent forms,  history and physicals, doctor's orders, etc.   Bring comfortable clothes that are loose fitting to wear upon  discharge. Take into consideration the type of surgery being performed.  Maintain your diet as advised per your physician the day prior to surgery.      Adequate rest the night before surgery is advised.   Park in the Parking lot behind the hospital or in the [facility] across the street from the parking lot. Parking is complimentary.  If you will be discharged the same day as your procedure, please arrange for a responsible adult to drive you home or to accompany you if traveling by taxi.   YOU WILL NOT BE PERMITTED TO DRIVE OR TO LEAVE THE HOSPITAL ALONE AFTER SURGERY.   It is strongly recommended that you arrange for someone to remain with you for the first 24 hrs following your surgery.       Thank you for your cooperation.  The Staff of [facility].        Bathing Instructions                                                                 Please shower the evening before and morning of your procedure with    ANTIBACTERIAL SOAP. ( DIAL, etc )  Concentrate on the surgical area   for at least 3 minutes and rinse completely. Dry off as usual.   Do not use any deodorant, powder, body lotions, perfume, after shave or    cologne.

## 2018-05-15 NOTE — TELEPHONE ENCOUNTER
----- Message from Talia Ponce MD sent at 5/15/2018  3:01 PM CDT -----  Please inform patient that H&H is 10.6/ 32.3.   She will not need preoperative transfusion.   Please also wish her a happy time next week. :-)

## 2018-05-15 NOTE — TELEPHONE ENCOUNTER
I spoke to the pt and informed her that she does not have to have a blood transfusion per Dr Ponce.

## 2018-05-16 LAB
C TRACH DNA SPEC QL NAA+PROBE: NOT DETECTED
N GONORRHOEA DNA SPEC QL NAA+PROBE: NOT DETECTED

## 2018-05-18 RX ORDER — METRONIDAZOLE 7.5 MG/G
GEL TOPICAL 2 TIMES DAILY
COMMUNITY
End: 2018-05-18 | Stop reason: SDUPTHER

## 2018-05-18 RX ORDER — METRONIDAZOLE 7.5 MG/G
GEL TOPICAL 2 TIMES DAILY
Qty: 1 TUBE | Refills: 0 | Status: SHIPPED | OUTPATIENT
Start: 2018-05-18 | End: 2018-06-04

## 2018-05-23 ENCOUNTER — TELEPHONE (OUTPATIENT)
Dept: OBSTETRICS AND GYNECOLOGY | Facility: CLINIC | Age: 53
End: 2018-05-23

## 2018-05-23 NOTE — TELEPHONE ENCOUNTER
I spoke to the pt and informed that she needs to take the medicine for a bacteria infection that she has.

## 2018-05-23 NOTE — TELEPHONE ENCOUNTER
----- Message from Vinayak Keller sent at 5/23/2018  2:09 PM CDT -----  Pt returning your call [phone]

## 2018-05-24 ENCOUNTER — TELEPHONE (OUTPATIENT)
Dept: OBSTETRICS AND GYNECOLOGY | Facility: CLINIC | Age: 53
End: 2018-05-24

## 2018-05-25 ENCOUNTER — ANESTHESIA (OUTPATIENT)
Dept: SURGERY | Facility: OTHER | Age: 53
End: 2018-05-25
Payer: COMMERCIAL

## 2018-05-25 ENCOUNTER — HOSPITAL ENCOUNTER (OUTPATIENT)
Facility: OTHER | Age: 53
Discharge: HOME | End: 2018-05-26
Attending: OBSTETRICS & GYNECOLOGY | Admitting: OBSTETRICS & GYNECOLOGY
Payer: COMMERCIAL

## 2018-05-25 ENCOUNTER — SURGERY (OUTPATIENT)
Age: 53
End: 2018-05-25

## 2018-05-25 VITALS — RESPIRATION RATE: 10 BRPM | HEART RATE: 69 BPM

## 2018-05-25 DIAGNOSIS — Z90.710 S/P LAPAROSCOPIC HYSTERECTOMY: Primary | ICD-10-CM

## 2018-05-25 DIAGNOSIS — N92.0 MENORRHAGIA: ICD-10-CM

## 2018-05-25 LAB
B-HCG UR QL: NEGATIVE
CTP QC/QA: YES
POCT GLUCOSE: 78 MG/DL (ref 70–110)

## 2018-05-25 PROCEDURE — 25000242 PHARM REV CODE 250 ALT 637 W/ HCPCS: Performed by: NURSE ANESTHETIST, CERTIFIED REGISTERED

## 2018-05-25 PROCEDURE — 25000003 PHARM REV CODE 250: Performed by: OBSTETRICS & GYNECOLOGY

## 2018-05-25 PROCEDURE — 27201423 OPTIME MED/SURG SUP & DEVICES STERILE SUPPLY: Performed by: OBSTETRICS & GYNECOLOGY

## 2018-05-25 PROCEDURE — 37000008 HC ANESTHESIA 1ST 15 MINUTES: Performed by: OBSTETRICS & GYNECOLOGY

## 2018-05-25 PROCEDURE — 99900035 HC TECH TIME PER 15 MIN (STAT)

## 2018-05-25 PROCEDURE — S0077 INJECTION, CLINDAMYCIN PHOSP: HCPCS | Performed by: OBSTETRICS & GYNECOLOGY

## 2018-05-25 PROCEDURE — 25000003 PHARM REV CODE 250: Performed by: STUDENT IN AN ORGANIZED HEALTH CARE EDUCATION/TRAINING PROGRAM

## 2018-05-25 PROCEDURE — 63600175 PHARM REV CODE 636 W HCPCS: Performed by: ANESTHESIOLOGY

## 2018-05-25 PROCEDURE — 63600175 PHARM REV CODE 636 W HCPCS: Performed by: NURSE ANESTHETIST, CERTIFIED REGISTERED

## 2018-05-25 PROCEDURE — 88307 TISSUE EXAM BY PATHOLOGIST: CPT | Mod: 26,,, | Performed by: PATHOLOGY

## 2018-05-25 PROCEDURE — 63600175 PHARM REV CODE 636 W HCPCS: Performed by: OBSTETRICS & GYNECOLOGY

## 2018-05-25 PROCEDURE — 94761 N-INVAS EAR/PLS OXIMETRY MLT: CPT

## 2018-05-25 PROCEDURE — P9045 ALBUMIN (HUMAN), 5%, 250 ML: HCPCS | Mod: JG | Performed by: NURSE ANESTHETIST, CERTIFIED REGISTERED

## 2018-05-25 PROCEDURE — 37000009 HC ANESTHESIA EA ADD 15 MINS: Performed by: OBSTETRICS & GYNECOLOGY

## 2018-05-25 PROCEDURE — 36000711: Performed by: OBSTETRICS & GYNECOLOGY

## 2018-05-25 PROCEDURE — 63600175 PHARM REV CODE 636 W HCPCS: Mod: JG | Performed by: NURSE ANESTHETIST, CERTIFIED REGISTERED

## 2018-05-25 PROCEDURE — 88307 TISSUE EXAM BY PATHOLOGIST: CPT | Performed by: PATHOLOGY

## 2018-05-25 PROCEDURE — 58573 TLH W/T/O UTERUS OVER 250 G: CPT | Mod: ,,, | Performed by: OBSTETRICS & GYNECOLOGY

## 2018-05-25 PROCEDURE — 82962 GLUCOSE BLOOD TEST: CPT | Performed by: OBSTETRICS & GYNECOLOGY

## 2018-05-25 PROCEDURE — 25000003 PHARM REV CODE 250: Performed by: NURSE ANESTHETIST, CERTIFIED REGISTERED

## 2018-05-25 PROCEDURE — 27000221 HC OXYGEN, UP TO 24 HOURS

## 2018-05-25 PROCEDURE — 36000710: Performed by: OBSTETRICS & GYNECOLOGY

## 2018-05-25 PROCEDURE — 71000033 HC RECOVERY, INTIAL HOUR: Performed by: OBSTETRICS & GYNECOLOGY

## 2018-05-25 PROCEDURE — 25000003 PHARM REV CODE 250: Performed by: ANESTHESIOLOGY

## 2018-05-25 PROCEDURE — 81025 URINE PREGNANCY TEST: CPT | Performed by: ANESTHESIOLOGY

## 2018-05-25 RX ORDER — BISACODYL 10 MG
10 SUPPOSITORY, RECTAL RECTAL DAILY PRN
Status: DISCONTINUED | OUTPATIENT
Start: 2018-05-25 | End: 2018-05-26 | Stop reason: HOSPADM

## 2018-05-25 RX ORDER — ALBUMIN HUMAN 50 G/1000ML
SOLUTION INTRAVENOUS CONTINUOUS PRN
Status: DISCONTINUED | OUTPATIENT
Start: 2018-05-25 | End: 2018-05-25

## 2018-05-25 RX ORDER — MEPERIDINE HYDROCHLORIDE 50 MG/ML
12.5 INJECTION INTRAMUSCULAR; INTRAVENOUS; SUBCUTANEOUS ONCE AS NEEDED
Status: DISCONTINUED | OUTPATIENT
Start: 2018-05-25 | End: 2018-05-25 | Stop reason: HOSPADM

## 2018-05-25 RX ORDER — PROMETHAZINE HYDROCHLORIDE 25 MG/ML
6.25 INJECTION, SOLUTION INTRAMUSCULAR; INTRAVENOUS
Status: DISCONTINUED | OUTPATIENT
Start: 2018-05-25 | End: 2018-05-25

## 2018-05-25 RX ORDER — DIPHENHYDRAMINE HYDROCHLORIDE 50 MG/ML
25 INJECTION INTRAMUSCULAR; INTRAVENOUS EVERY 4 HOURS PRN
Status: DISCONTINUED | OUTPATIENT
Start: 2018-05-25 | End: 2018-05-26 | Stop reason: HOSPADM

## 2018-05-25 RX ORDER — PROMETHAZINE HYDROCHLORIDE 25 MG/ML
INJECTION, SOLUTION INTRAMUSCULAR; INTRAVENOUS
Status: DISPENSED
Start: 2018-05-25 | End: 2018-05-26

## 2018-05-25 RX ORDER — SODIUM CHLORIDE 0.9 % (FLUSH) 0.9 %
3 SYRINGE (ML) INJECTION
Status: DISCONTINUED | OUTPATIENT
Start: 2018-05-25 | End: 2018-05-26 | Stop reason: HOSPADM

## 2018-05-25 RX ORDER — LIDOCAINE HCL/PF 100 MG/5ML
SYRINGE (ML) INTRAVENOUS
Status: DISCONTINUED | OUTPATIENT
Start: 2018-05-25 | End: 2018-05-25

## 2018-05-25 RX ORDER — MIDAZOLAM HYDROCHLORIDE 1 MG/ML
INJECTION INTRAMUSCULAR; INTRAVENOUS
Status: DISCONTINUED | OUTPATIENT
Start: 2018-05-25 | End: 2018-05-25

## 2018-05-25 RX ORDER — SODIUM CHLORIDE, SODIUM LACTATE, POTASSIUM CHLORIDE, CALCIUM CHLORIDE 600; 310; 30; 20 MG/100ML; MG/100ML; MG/100ML; MG/100ML
INJECTION, SOLUTION INTRAVENOUS CONTINUOUS
Status: DISCONTINUED | OUTPATIENT
Start: 2018-05-25 | End: 2018-05-25

## 2018-05-25 RX ORDER — OXYCODONE AND ACETAMINOPHEN 5; 325 MG/1; MG/1
1 TABLET ORAL EVERY 4 HOURS PRN
Status: DISCONTINUED | OUTPATIENT
Start: 2018-05-25 | End: 2018-05-26 | Stop reason: HOSPADM

## 2018-05-25 RX ORDER — GLYCOPYRROLATE 0.2 MG/ML
INJECTION INTRAMUSCULAR; INTRAVENOUS
Status: DISCONTINUED | OUTPATIENT
Start: 2018-05-25 | End: 2018-05-25

## 2018-05-25 RX ORDER — ALBUTEROL SULFATE 90 UG/1
AEROSOL, METERED RESPIRATORY (INHALATION)
Status: DISCONTINUED | OUTPATIENT
Start: 2018-05-25 | End: 2018-05-25

## 2018-05-25 RX ORDER — FENTANYL CITRATE 50 UG/ML
25 INJECTION, SOLUTION INTRAMUSCULAR; INTRAVENOUS EVERY 5 MIN PRN
Status: DISCONTINUED | OUTPATIENT
Start: 2018-05-25 | End: 2018-05-25 | Stop reason: HOSPADM

## 2018-05-25 RX ORDER — MUPIROCIN 20 MG/G
1 OINTMENT TOPICAL 2 TIMES DAILY
Status: DISCONTINUED | OUTPATIENT
Start: 2018-05-25 | End: 2018-05-26 | Stop reason: HOSPADM

## 2018-05-25 RX ORDER — OXYCODONE AND ACETAMINOPHEN 10; 325 MG/1; MG/1
1 TABLET ORAL EVERY 4 HOURS PRN
Status: DISCONTINUED | OUTPATIENT
Start: 2018-05-25 | End: 2018-05-26 | Stop reason: HOSPADM

## 2018-05-25 RX ORDER — DIPHENHYDRAMINE HCL 25 MG
25 CAPSULE ORAL EVERY 4 HOURS PRN
Status: DISCONTINUED | OUTPATIENT
Start: 2018-05-25 | End: 2018-05-26 | Stop reason: HOSPADM

## 2018-05-25 RX ORDER — ONDANSETRON 2 MG/ML
4 INJECTION INTRAMUSCULAR; INTRAVENOUS DAILY PRN
Status: DISCONTINUED | OUTPATIENT
Start: 2018-05-25 | End: 2018-05-25 | Stop reason: HOSPADM

## 2018-05-25 RX ORDER — ONDANSETRON 8 MG/1
8 TABLET, ORALLY DISINTEGRATING ORAL EVERY 8 HOURS PRN
Status: DISCONTINUED | OUTPATIENT
Start: 2018-05-25 | End: 2018-05-26 | Stop reason: HOSPADM

## 2018-05-25 RX ORDER — PROPOFOL 10 MG/ML
VIAL (ML) INTRAVENOUS
Status: DISCONTINUED | OUTPATIENT
Start: 2018-05-25 | End: 2018-05-25

## 2018-05-25 RX ORDER — ACETAMINOPHEN 10 MG/ML
INJECTION, SOLUTION INTRAVENOUS
Status: DISCONTINUED | OUTPATIENT
Start: 2018-05-25 | End: 2018-05-25

## 2018-05-25 RX ORDER — ONDANSETRON HYDROCHLORIDE 2 MG/ML
INJECTION, SOLUTION INTRAMUSCULAR; INTRAVENOUS
Status: DISCONTINUED | OUTPATIENT
Start: 2018-05-25 | End: 2018-05-25

## 2018-05-25 RX ORDER — IBUPROFEN 600 MG/1
600 TABLET ORAL EVERY 6 HOURS
Status: DISCONTINUED | OUTPATIENT
Start: 2018-05-26 | End: 2018-05-26 | Stop reason: HOSPADM

## 2018-05-25 RX ORDER — AMOXICILLIN 250 MG
1 CAPSULE ORAL 2 TIMES DAILY
Status: DISCONTINUED | OUTPATIENT
Start: 2018-05-25 | End: 2018-05-26 | Stop reason: HOSPADM

## 2018-05-25 RX ORDER — ROCURONIUM BROMIDE 10 MG/ML
INJECTION, SOLUTION INTRAVENOUS
Status: DISCONTINUED | OUTPATIENT
Start: 2018-05-25 | End: 2018-05-25

## 2018-05-25 RX ORDER — DEXAMETHASONE SODIUM PHOSPHATE 4 MG/ML
INJECTION, SOLUTION INTRA-ARTICULAR; INTRALESIONAL; INTRAMUSCULAR; INTRAVENOUS; SOFT TISSUE
Status: DISCONTINUED | OUTPATIENT
Start: 2018-05-25 | End: 2018-05-25

## 2018-05-25 RX ORDER — HYDROMORPHONE HCL 2 MG/ML
0.4 VIAL (ML) INJECTION EVERY 5 MIN PRN
Status: DISCONTINUED | OUTPATIENT
Start: 2018-05-25 | End: 2018-05-26 | Stop reason: HOSPADM

## 2018-05-25 RX ORDER — CLINDAMYCIN PHOSPHATE 900 MG/50ML
900 INJECTION, SOLUTION INTRAVENOUS
Status: COMPLETED | OUTPATIENT
Start: 2018-05-25 | End: 2018-05-25

## 2018-05-25 RX ORDER — HYDROMORPHONE HYDROCHLORIDE 1 MG/ML
0.5 INJECTION, SOLUTION INTRAMUSCULAR; INTRAVENOUS; SUBCUTANEOUS EVERY 4 HOURS PRN
Status: DISCONTINUED | OUTPATIENT
Start: 2018-05-25 | End: 2018-05-26 | Stop reason: HOSPADM

## 2018-05-25 RX ORDER — LIDOCAINE HYDROCHLORIDE 10 MG/ML
0.5 INJECTION, SOLUTION EPIDURAL; INFILTRATION; INTRACAUDAL; PERINEURAL ONCE
Status: DISCONTINUED | OUTPATIENT
Start: 2018-05-25 | End: 2018-05-25 | Stop reason: HOSPADM

## 2018-05-25 RX ORDER — VECURONIUM BROMIDE FOR INJECTION 1 MG/ML
INJECTION, POWDER, LYOPHILIZED, FOR SOLUTION INTRAVENOUS
Status: DISCONTINUED | OUTPATIENT
Start: 2018-05-25 | End: 2018-05-25

## 2018-05-25 RX ORDER — NEOSTIGMINE METHYLSULFATE 1 MG/ML
INJECTION, SOLUTION INTRAVENOUS
Status: DISCONTINUED | OUTPATIENT
Start: 2018-05-25 | End: 2018-05-25

## 2018-05-25 RX ORDER — OXYCODONE HYDROCHLORIDE 5 MG/1
5 TABLET ORAL
Status: DISCONTINUED | OUTPATIENT
Start: 2018-05-25 | End: 2018-05-25 | Stop reason: HOSPADM

## 2018-05-25 RX ORDER — KETOROLAC TROMETHAMINE 30 MG/ML
INJECTION, SOLUTION INTRAMUSCULAR; INTRAVENOUS
Status: DISCONTINUED | OUTPATIENT
Start: 2018-05-25 | End: 2018-05-25

## 2018-05-25 RX ORDER — KETOROLAC TROMETHAMINE 30 MG/ML
30 INJECTION, SOLUTION INTRAMUSCULAR; INTRAVENOUS EVERY 6 HOURS
Status: DISCONTINUED | OUTPATIENT
Start: 2018-05-26 | End: 2018-05-26 | Stop reason: HOSPADM

## 2018-05-25 RX ORDER — FENTANYL CITRATE 50 UG/ML
INJECTION, SOLUTION INTRAMUSCULAR; INTRAVENOUS
Status: DISCONTINUED | OUTPATIENT
Start: 2018-05-25 | End: 2018-05-25

## 2018-05-25 RX ADMIN — FENTANYL CITRATE 150 MCG: 50 INJECTION, SOLUTION INTRAMUSCULAR; INTRAVENOUS at 03:05

## 2018-05-25 RX ADMIN — PROMETHAZINE HYDROCHLORIDE 6.25 MG: 25 INJECTION INTRAMUSCULAR; INTRAVENOUS at 07:05

## 2018-05-25 RX ADMIN — ALBUTEROL SULFATE 4 PUFF: 90 AEROSOL, METERED RESPIRATORY (INHALATION) at 04:05

## 2018-05-25 RX ADMIN — ROCURONIUM BROMIDE 50 MG: 10 INJECTION, SOLUTION INTRAVENOUS at 03:05

## 2018-05-25 RX ADMIN — GLYCOPYRROLATE 0.2 MG: 0.2 INJECTION, SOLUTION INTRAMUSCULAR; INTRAVENOUS at 03:05

## 2018-05-25 RX ADMIN — HYDROMORPHONE HYDROCHLORIDE 0.4 MG: 2 INJECTION INTRAMUSCULAR; INTRAVENOUS; SUBCUTANEOUS at 08:05

## 2018-05-25 RX ADMIN — STANDARDIZED SENNA CONCENTRATE AND DOCUSATE SODIUM 1 TABLET: 8.6; 5 TABLET, FILM COATED ORAL at 09:05

## 2018-05-25 RX ADMIN — SODIUM CHLORIDE, SODIUM LACTATE, POTASSIUM CHLORIDE, AND CALCIUM CHLORIDE: 600; 310; 30; 20 INJECTION, SOLUTION INTRAVENOUS at 02:05

## 2018-05-25 RX ADMIN — SODIUM CHLORIDE, SODIUM LACTATE, POTASSIUM CHLORIDE, AND CALCIUM CHLORIDE: 600; 310; 30; 20 INJECTION, SOLUTION INTRAVENOUS at 06:05

## 2018-05-25 RX ADMIN — CLINDAMYCIN PHOSPHATE 900 MG: 18 INJECTION, SOLUTION INTRAVENOUS at 03:05

## 2018-05-25 RX ADMIN — ONDANSETRON 4 MG: 2 INJECTION INTRAMUSCULAR; INTRAVENOUS at 07:05

## 2018-05-25 RX ADMIN — PROPOFOL 200 MG: 10 INJECTION, EMULSION INTRAVENOUS at 03:05

## 2018-05-25 RX ADMIN — GLYCOPYRROLATE 0.8 MG: 0.2 INJECTION, SOLUTION INTRAMUSCULAR; INTRAVENOUS at 06:05

## 2018-05-25 RX ADMIN — DEXAMETHASONE SODIUM PHOSPHATE 8 MG: 4 INJECTION, SOLUTION INTRAMUSCULAR; INTRAVENOUS at 03:05

## 2018-05-25 RX ADMIN — GENTAMICIN SULFATE 379.5 MG: 40 INJECTION, SOLUTION INTRAMUSCULAR; INTRAVENOUS at 03:05

## 2018-05-25 RX ADMIN — LIDOCAINE HYDROCHLORIDE 80 MG: 20 INJECTION, SOLUTION INTRAVENOUS at 03:05

## 2018-05-25 RX ADMIN — KETOROLAC TROMETHAMINE 30 MG: 30 INJECTION, SOLUTION INTRAMUSCULAR; INTRAVENOUS at 06:05

## 2018-05-25 RX ADMIN — ACETAMINOPHEN 1000 MG: 10 INJECTION, SOLUTION INTRAVENOUS at 03:05

## 2018-05-25 RX ADMIN — ONDANSETRON 4 MG: 2 INJECTION, SOLUTION INTRAMUSCULAR; INTRAVENOUS at 03:05

## 2018-05-25 RX ADMIN — NEOSTIGMINE METHYLSULFATE 5 MG: 1 INJECTION INTRAVENOUS at 06:05

## 2018-05-25 RX ADMIN — VECURONIUM BROMIDE FOR INJECTION 1 MG: 1 INJECTION, POWDER, LYOPHILIZED, FOR SOLUTION INTRAVENOUS at 04:05

## 2018-05-25 RX ADMIN — ALBUMIN (HUMAN): 2.5 SOLUTION INTRAVENOUS at 03:05

## 2018-05-25 RX ADMIN — MUPIROCIN 1 G: 20 OINTMENT TOPICAL at 09:05

## 2018-05-25 RX ADMIN — CARBOXYMETHYLCELLULOSE SODIUM 2 DROP: 2.5 SOLUTION/ DROPS OPHTHALMIC at 03:05

## 2018-05-25 RX ADMIN — MIDAZOLAM HYDROCHLORIDE 2 MG: 1 INJECTION, SOLUTION INTRAMUSCULAR; INTRAVENOUS at 02:05

## 2018-05-25 NOTE — ANESTHESIA POSTPROCEDURE EVALUATION
"Anesthesia Post Evaluation    Patient: Karli Samuel    Procedure(s) Performed: Procedure(s) (LRB):  HYSTERECTOMY-TOTAL LAPAROSCOPIC (TLH) (N/A)  CYSTOSCOPY (N/A)    Final Anesthesia Type: general  Patient location during evaluation: PACU  Patient participation: Yes- Able to Participate  Level of consciousness: awake and alert  Post-procedure vital signs: reviewed and stable  Pain management: adequate  Airway patency: patent  PONV status at discharge: No PONV  Anesthetic complications: no      Cardiovascular status: blood pressure returned to baseline  Respiratory status: unassisted and room air  Hydration status: euvolemic  Follow-up not needed.        Visit Vitals  BP (!) 102/55   Pulse 65   Temp 36.9 °C (98.5 °F) (Oral)   Resp 16   Ht 5' 6" (1.676 m)   Wt 100.7 kg (222 lb 0.1 oz)   LMP 05/06/2018   SpO2 100%   Breastfeeding? No   BMI 35.83 kg/m²       Pain/Megha Score: Pain Assessment Performed: Yes (5/25/2018  6:35 PM)  Presence of Pain: non-verbal indicators absent (5/25/2018  6:35 PM)  Megha Score: 9 (5/25/2018  6:35 PM)      "

## 2018-05-25 NOTE — OP NOTE
OPERATIVE REPORT    DATE:05/25/2018    PREOPERATIVE DIAGNOSIS  1. AUB-L    POSTOPERATIVE DIAGNOSIS  1. Same    PROCEDURE:  1. Total Laparoscopic Hysterectomy  2. Bilateral salpingectomy    SURGEON:   Dr. Talia Ponce    ASSISTANT:   Dr. aJnet Aviles (res)    FINDINGS: Enlarged fibroid uterus. Bilateral ovaries and tubes were normal in appearance. Liver, gallbladder, and stomach were normal in appearance. On post op cysto there was eflux from bilateral ureters and no evidence of bladder injury.    ANESTHESIA: General    COMPLICATIONS: None    EBL: 50 cc    IV FLUIDS: 2300 cc LR + 500 cc albumin    URINE OUTPUT: 270 cc    PROCEDURE: Patient was taking to the operating room where general anesthesia was administered and found to be adequate.  She was prepped and draped in the dorsal lithotomy position.  A weighted sterile speculum was placed in the vagina.  The anterior lip of the cervix was grasped with a single tooth tenaculum.  The uterus was sounded to approximately 4 inches.  A stay suture of 0-Vicryl was placed at 12 o'clock and 6 o'clock on the cervix.  A ANDREE manipulator was placed inside the endometrial cavity.      Gloves were changed.  An 10mm incision was made to superior to the umbilicus. A Veress step needle was inserted into the umbilicus under tenting of the anterior abdominal wall.  Placement into the peritoneal cavity was confirmed via saline drop test.  The abdomen was insufflated to 15mm Hg using Carbon dioxide. A 10 mm Optiview trocar was advanced through this incision.  Excellent hemostasis was noted. Survey of the pelvic organs completed. The uterus was noted to be enlarged. Ovaries were normal in appearance. Tubes were normal. The patient was placed in deep Trendelenburg.  An 5 mm left lateral skin incision was made with a scalpel and a 5 mm trocar was advanced through this incision under visualization of the camera.  A 5 mm right lateral skin incision was made with the scalpel.  A 5 mm  trocar was advanced through this incision under visualization of the camera.  Excellent hemostasis was noted.      Both ureters were visualized and forward vermiculation was noted bilaterally. Attention was turned to the right round ligament. This was cauterized and transected and continued anteriorly to create the bladder flap to the midline. The right utero-ovarian ligament was cauterized and transected and carried down to the level of the uterine vessels.  The vessels were cauterized but not transected.    Attention was turned to the left round ligament which was cauterized and transected and continued anteriorly to complete the bladder flap to the midline.  The left utero-ovarian ligament was cauterized and transected and carried down to the level of the uterine vessels. The vessels were cauterized but not transected.     Attention was turned to the anterior portion of the cervix.  The bladder was dissected off the cervix. The anterior colpotomy was created.  This was continued to the level of the uterine vessels bilaterally. Attention was turned to the posterior portion of the uterus.  The posterior colpotomy was created and carried around to the level of the uterine vessels bilaterally. The anterior and posterior colpotomies were connected.     The uterus was removed vaginally.  A moist laparotomy sponge inside of glove was placed in the vagina to maintain intraperitoneal pressure. Attention was turned to the left tube. The tube was elevated mesosalpinx was cauterized and transected to remove the tube. Attention was turned to the right tube. The mesosalpinx was cauterized and transected to remove the tube. Both tube were removed through the vagina. The pelvis was copiously irrigated and suctioned.  Excellent hemostasis was noted. The vagina was closed by the vaginal approach with 0 vicryl suture in an interrupted fashion.    Attention was turned to the anterior abdominal wall. The abdomen was deflated and all  trocars were removed.  The skin was closed with 4-0 Monocryl in a subcuticular fashion.  The sponge and glove were removed.  Sponge, lap, and needle counts were correct x 2.  The patient was taken to the recovery room in stable condition with the boateng draining clear urine.        Janet Aviles MD  PGY 4 OB/GYN

## 2018-05-25 NOTE — TRANSFER OF CARE
"Anesthesia Transfer of Care Note    Patient: Karil Samuel    Procedure(s) Performed: Procedure(s) (LRB):  HYSTERECTOMY-TOTAL LAPAROSCOPIC (TLH) (N/A)  CYSTOSCOPY (N/A)    Patient location: PACU    Anesthesia Type: general    Transport from OR: Transported from OR on 2-3 L/min O2 by NC with adequate spontaneous ventilation    Post pain: adequate analgesia    Post assessment: no apparent anesthetic complications and tolerated procedure well    Post vital signs: stable    Level of consciousness: sedated    Nausea/Vomiting: no nausea/vomiting    Complications: none    Transfer of care protocol was followed      Last vitals:   Visit Vitals  BP (!) 102/55   Pulse 65   Temp 36.9 °C (98.5 °F) (Oral)   Resp 16   Ht 5' 6" (1.676 m)   Wt 100.7 kg (222 lb 0.1 oz)   LMP 05/06/2018   SpO2 100%   Breastfeeding? No   BMI 35.83 kg/m²     "

## 2018-05-26 VITALS
SYSTOLIC BLOOD PRESSURE: 118 MMHG | OXYGEN SATURATION: 100 % | DIASTOLIC BLOOD PRESSURE: 63 MMHG | TEMPERATURE: 99 F | HEIGHT: 66 IN | BODY MASS INDEX: 37.49 KG/M2 | HEART RATE: 53 BPM | WEIGHT: 233.25 LBS | RESPIRATION RATE: 18 BRPM

## 2018-05-26 PROBLEM — Z90.710 S/P LAPAROSCOPIC HYSTERECTOMY: Status: ACTIVE | Noted: 2018-05-26

## 2018-05-26 LAB
BASOPHILS # BLD AUTO: 0 K/UL
BASOPHILS NFR BLD: 0 %
DIFFERENTIAL METHOD: ABNORMAL
EOSINOPHIL # BLD AUTO: 0 K/UL
EOSINOPHIL NFR BLD: 0 %
ERYTHROCYTE [DISTWIDTH] IN BLOOD BY AUTOMATED COUNT: 17 %
HCT VFR BLD AUTO: 31.3 %
HGB BLD-MCNC: 10.2 G/DL
LYMPHOCYTES # BLD AUTO: 1.1 K/UL
LYMPHOCYTES NFR BLD: 12 %
MCH RBC QN AUTO: 24.8 PG
MCHC RBC AUTO-ENTMCNC: 32.6 G/DL
MCV RBC AUTO: 76 FL
MONOCYTES # BLD AUTO: 0.5 K/UL
MONOCYTES NFR BLD: 5.1 %
NEUTROPHILS # BLD AUTO: 7.8 K/UL
NEUTROPHILS NFR BLD: 82.8 %
PLATELET # BLD AUTO: 197 K/UL
PMV BLD AUTO: 10.6 FL
RBC # BLD AUTO: 4.12 M/UL
WBC # BLD AUTO: 9.44 K/UL

## 2018-05-26 PROCEDURE — 25000003 PHARM REV CODE 250: Performed by: STUDENT IN AN ORGANIZED HEALTH CARE EDUCATION/TRAINING PROGRAM

## 2018-05-26 PROCEDURE — 36415 COLL VENOUS BLD VENIPUNCTURE: CPT

## 2018-05-26 PROCEDURE — 63600175 PHARM REV CODE 636 W HCPCS: Performed by: STUDENT IN AN ORGANIZED HEALTH CARE EDUCATION/TRAINING PROGRAM

## 2018-05-26 PROCEDURE — 85025 COMPLETE CBC W/AUTO DIFF WBC: CPT

## 2018-05-26 RX ORDER — OXYCODONE AND ACETAMINOPHEN 5; 325 MG/1; MG/1
1 TABLET ORAL EVERY 4 HOURS PRN
Qty: 30 TABLET | Refills: 0 | Status: SHIPPED | OUTPATIENT
Start: 2018-05-26 | End: 2018-07-09

## 2018-05-26 RX ORDER — OXYCODONE AND ACETAMINOPHEN 5; 325 MG/1; MG/1
1 TABLET ORAL EVERY 4 HOURS PRN
Qty: 30 TABLET | Refills: 0 | Status: SHIPPED | OUTPATIENT
Start: 2018-05-26 | End: 2018-05-26

## 2018-05-26 RX ORDER — IBUPROFEN 800 MG/1
800 TABLET ORAL EVERY 6 HOURS PRN
Qty: 30 TABLET | Refills: 2 | Status: SHIPPED | OUTPATIENT
Start: 2018-05-26 | End: 2018-06-04

## 2018-05-26 RX ADMIN — STANDARDIZED SENNA CONCENTRATE AND DOCUSATE SODIUM 1 TABLET: 8.6; 5 TABLET, FILM COATED ORAL at 08:05

## 2018-05-26 RX ADMIN — KETOROLAC TROMETHAMINE 30 MG: 30 INJECTION, SOLUTION INTRAMUSCULAR at 06:05

## 2018-05-26 RX ADMIN — KETOROLAC TROMETHAMINE 30 MG: 30 INJECTION, SOLUTION INTRAMUSCULAR at 01:05

## 2018-05-26 RX ADMIN — KETOROLAC TROMETHAMINE 30 MG: 30 INJECTION, SOLUTION INTRAMUSCULAR at 12:05

## 2018-05-26 RX ADMIN — MUPIROCIN 1 G: 20 OINTMENT TOPICAL at 08:05

## 2018-05-26 RX ADMIN — OXYCODONE HYDROCHLORIDE AND ACETAMINOPHEN 1 TABLET: 10; 325 TABLET ORAL at 06:05

## 2018-05-26 NOTE — HPI
Karli Samuel is a 53 y.o. female, , Patient's last menstrual period was 2018.,  presents for a pre-op exam for TLH/BS with ovarian conservation scheduled on 2018.       Patient presents with a long history of menorrhagia and pelvic pain. Heavy menses every month lasting 11 days using 6 pads per day with clotting.     Patient counseled in detail on options available for menorrhagia and uterine fibroids. Medical therapy and surgical options discussed in detail. Patient desires to proceed with definitive therapy with hysterectomy. A discussion was also held in reference to ovarian hormone production, a woman's lifetime risk of developing ovarian cancer and the possible need for reoperation for ovarian pathology. She desires NOT to have ovaries removed at the time of surgery.     Long discussion held patient today concerning her surgery, hospital course on the day of surgery and post operative course. Precautions after surgery discussed in detail.  Risks, alternatives and benefits of surgery discussed with patient in detail and consent explained in detail. Questions were answered and patient voices understanding. Plan pre-op with Anesthesia at [facility].

## 2018-05-26 NOTE — ASSESSMENT & PLAN NOTE
-continue routine advances  -PO pain medication PRN. Dilaudid for BTP  -Regular diet  -Zofran and phenergan available PRN nausea  -H/H 10.3/31.3 this AM.  Stable from preop  -Iqbal in place. Will remove this AM.  Adequate UOP overnight   -Encourage ambulation    Dispo: Plan for discharge today if ambulating, voiding and tolerating PO.

## 2018-05-26 NOTE — DISCHARGE SUMMARY
[facility]  Obstetrics & Gynecology  Discharge Summary    Patient Name: Karli Samuel  MRN: [id number]  Admission Date: 2018  Hospital Length of Stay: 0 days  Discharge Date and Time:  2018 12:39 PM  Attending Physician: Talia Ponce MD   Discharging Provider: Janet Aviles MD  Primary Care Provider: Evette Ramos MD    HPI:  Karli Samuel is a 53 y.o. female, , Patient's last menstrual period was 2018.,  presents for a pre-op exam for TLH/BS with ovarian conservation scheduled on 2018.       Patient presents with a long history of menorrhagia and pelvic pain. Heavy menses every month lasting 11 days using 6 pads per day with clotting.     Patient counseled in detail on options available for menorrhagia and uterine fibroids. Medical therapy and surgical options discussed in detail. Patient desires to proceed with definitive therapy with hysterectomy. A discussion was also held in reference to ovarian hormone production, a woman's lifetime risk of developing ovarian cancer and the possible need for reoperation for ovarian pathology. She desires NOT to have ovaries removed at the time of surgery.     Long discussion held patient today concerning her surgery, hospital course on the day of surgery and post operative course. Precautions after surgery discussed in detail.  Risks, alternatives and benefits of surgery discussed with patient in detail and consent explained in detail. Questions were answered and patient voices understanding. Plan pre-op with Anesthesia at [facility].       Hospital Course:  2018: To OR for scheduled surgery.  No intraop complications.  2018: POD#1.  No acute issues overnight.  Doing well.  Ambulating, voiding, tolerating PO.  Pt meeting post op milestones.  Plan to discharge home today with plans to follow up with Dr. Ponce.        Procedure(s) (LRB):  HYSTERECTOMY-TOTAL LAPAROSCOPIC (TLH)  (N/A)  CYSTOSCOPY (N/A)         Significant Diagnostic Studies: Labs:   CBC   Recent Labs  Lab 05/26/18  0438   WBC 9.44   HGB 10.2*   HCT 31.3*          Pending Diagnostic Studies:     None        Final Active Diagnoses:    Diagnosis Date Noted POA    PRINCIPAL PROBLEM:  S/P laparoscopic hysterectomy/BS/cysto [Z90.710] 05/26/2018 No    Menorrhagia [N92.0] 05/25/2018 Yes      Problems Resolved During this Admission:    Diagnosis Date Noted Date Resolved POA        Discharged Condition: good    Disposition: Home or Self Care    Follow Up:  Follow-up Information     Talia Ponce MD In 6 weeks.    Specialties:  Obstetrics, Obstetrics and Gynecology  Why:  Post op visit  Contact information:  [address] [id number] [phone]                 Patient Instructions:     Diet Adult Regular     Call MD for:  temperature >100.4     Call MD for:  persistent nausea and vomiting or diarrhea     Call MD for:  severe uncontrolled pain     Call MD for:  redness, tenderness, or signs of infection (pain, swelling, redness, odor or green/yellow discharge around incision site)     Call MD for:  difficulty breathing or increased cough     Call MD for:  severe persistent headache     Call MD for:  worsening rash     Call MD for:  persistent dizziness, light-headedness, or visual disturbances     Call MD for:  increased confusion or weakness     No dressing needed     Activity as tolerated     Weight bearing restrictions (specify)   Order Comments: Nothing > 10 lbs     Other restrictions (specify):   Order Comments: Pelvic rest until cleared by MD       Medications:  Reconciled Home Medications:      Medication List      START taking these medications    ibuprofen 800 MG tablet  Commonly known as:  ADVIL,MOTRIN  Take 1 tablet (800 mg total) by mouth every 6 (six) hours as needed for Pain.     oxyCODONE-acetaminophen 5-325 mg per tablet  Commonly known as:  PERCOCET  Take 1 tablet by mouth every 4  (four) hours as needed.        CONTINUE taking these medications    IRON ORAL  Take 1 tablet by mouth 3 (three) times daily.     metroNIDAZOLE 0.75 % gel  Commonly known as:  METROGEL  Apply topically 2 (two) times daily.            Janet Aviles MD  Obstetrics & Gynecology  [facility]

## 2018-05-26 NOTE — NURSING
Discharge instructions reviewed and patient verbalized understanding. Follow up appointments discussed with patient. Pain medication and side effects discussed with patient. IV catheter removed with catheter intact without any signs of swelling, redness or pain. Patient remained free from falls, injury, and skin break down this hospilization. Patient in room with son waiting for transport.

## 2018-05-26 NOTE — PROGRESS NOTES
[facility]  Obstetrics & Gynecology  Progress Note    Patient Name: Karli Samuel  MRN: [id number]  Admission Date: 2018  Primary Care Provider: Evette Ramos MD  Principal Problem: <principal problem not specified>    Subjective:     HPI:  Karli Samuel is a 53 y.o. female, , Patient's last menstrual period was 2018.,  presents for a pre-op exam for TLH/BS with ovarian conservation scheduled on 2018.       Patient presents with a long history of menorrhagia and pelvic pain. Heavy menses every month lasting 11 days using 6 pads per day with clotting.     Patient counseled in detail on options available for menorrhagia and uterine fibroids. Medical therapy and surgical options discussed in detail. Patient desires to proceed with definitive therapy with hysterectomy. A discussion was also held in reference to ovarian hormone production, a woman's lifetime risk of developing ovarian cancer and the possible need for reoperation for ovarian pathology. She desires NOT to have ovaries removed at the time of surgery.     Long discussion held patient today concerning her surgery, hospital course on the day of surgery and post operative course. Precautions after surgery discussed in detail.  Risks, alternatives and benefits of surgery discussed with patient in detail and consent explained in detail. Questions were answered and patient voices understanding. Plan pre-op with Anesthesia at [facility].       Interval History:  Pt see and examined this morning.  Pt is doing well post-op.  Pt reports minimal abdominal pain that is controlled with pain medication.  Tolerating PO without difficulty.  Denies nausea/vomiting at this time.  Iqbal in place, draining well.  Has not yet ambulated.  Pt denies fever/chills. No flatus.  No BM      Scheduled Meds:   ibuprofen  600 mg Oral Q6H    ketorolac  30 mg Intravenous Q6H    mupirocin  1 g Nasal BID     promethazine        senna-docusate 8.6-50 mg  1 tablet Oral BID     Continuous Infusions:  PRN Meds:bisacodyl, diphenhydrAMINE, diphenhydrAMINE, HYDROmorphone, HYDROmorphone, ondansetron, oxyCODONE-acetaminophen, oxyCODONE-acetaminophen, promethazine (PHENERGAN) IVPB, sodium chloride 0.9%    Review of patient's allergies indicates:   Allergen Reactions    Pcn [penicillins] Anaphylaxis       Objective:     Vital Signs (Most Recent):  Temp: 98.3 °F (36.8 °C) (05/26/18 0433)  Pulse: (!) 51 (05/26/18 0433)  Resp: 16 (05/25/18 2132)  BP: 111/62 (05/26/18 0433)  SpO2: 97 % (05/26/18 0433) Vital Signs (24h Range):  Temp:  [98.1 °F (36.7 °C)-98.5 °F (36.9 °C)] 98.3 °F (36.8 °C)  Pulse:  [51-80] 51  Resp:  [10-16] 16  SpO2:  [96 %-100 %] 97 %  BP: (100-124)/(53-80) 111/62     Weight: 105.8 kg (233 lb 4 oz)  Body mass index is 37.65 kg/m².  Patient's last menstrual period was 05/06/2018.    I&O (Last 24H):    Intake/Output Summary (Last 24 hours) at 05/26/18 0642  Last data filed at 05/26/18 0616   Gross per 24 hour   Intake             2800 ml   Output             1820 ml   Net              980 ml       Physical Exam:   Constitutional: She is oriented to person, place, and time. She appears well-developed and well-nourished.    HENT:   Head: Normocephalic and atraumatic.    Eyes: EOM are normal.    Neck: Normal range of motion.    Cardiovascular: Normal rate, regular rhythm, normal heart sounds and intact distal pulses.     Pulmonary/Chest: Effort normal and breath sounds normal.        Abdominal: Soft. She exhibits no distension. Tenderness: appropriately tender to palpation. There is no rebound and no guarding.   Mildly hypoactive BS.  Incisions c/d/i.  No signs of active bleeding.  No signs of infection             Musculoskeletal: Normal range of motion.       Neurological: She is alert and oriented to person, place, and time. She has normal reflexes.    Skin: Skin is warm and dry.    Psychiatric: She has a normal mood  and affect. Her behavior is normal. Thought content normal.       Laboratory:  CBC:   Recent Labs  Lab 05/26/18  0438   WBC 9.44   RBC 4.12   HGB 10.2*   HCT 31.3*      MCV 76*   MCH 24.8*   MCHC 32.6     BMP  Lab Results   Component Value Date     05/15/2018    K 3.7 05/15/2018     05/15/2018    CO2 23 05/15/2018    BUN 7 05/15/2018    CREATININE 0.6 05/15/2018    CALCIUM 8.9 05/15/2018    ANIONGAP 10 05/15/2018    ESTGFRAFRICA >60 05/15/2018    EGFRNONAA >60 05/15/2018         Diagnostic Results:  none    Assessment/Plan:     S/P laparoscopic hysterectomy/BS/cysto    -continue routine advances  -PO pain medication PRN. Dilaudid for BTP  -Regular diet  -Zofran and phenergan available PRN nausea  -H/H 10.3/31.3 this AM.  Stable from preop  -Iqbal in place. Will remove this AM.  Adequate UOP overnight   -Encourage ambulation    Dispo: Plan for discharge today if ambulating, voiding and tolerating PO.            Roberta Quintanilla MD  Obstetrics & Gynecology  [facility]

## 2018-05-26 NOTE — SUBJECTIVE & OBJECTIVE
Interval History:  Pt see and examined this morning.  Pt is doing well post-op.  Pt reports minimal abdominal pain that is controlled with pain medication.  Tolerating PO without difficulty.  Denies nausea/vomiting at this time.  Iqbal in place, draining well.  Has not yet ambulated.  Pt denies fever/chills. No flatus.  No BM      Scheduled Meds:   ibuprofen  600 mg Oral Q6H    ketorolac  30 mg Intravenous Q6H    mupirocin  1 g Nasal BID    promethazine        senna-docusate 8.6-50 mg  1 tablet Oral BID     Continuous Infusions:  PRN Meds:bisacodyl, diphenhydrAMINE, diphenhydrAMINE, HYDROmorphone, HYDROmorphone, ondansetron, oxyCODONE-acetaminophen, oxyCODONE-acetaminophen, promethazine (PHENERGAN) IVPB, sodium chloride 0.9%    Review of patient's allergies indicates:   Allergen Reactions    Pcn [penicillins] Anaphylaxis       Objective:     Vital Signs (Most Recent):  Temp: 98.3 °F (36.8 °C) (05/26/18 0433)  Pulse: (!) 51 (05/26/18 0433)  Resp: 16 (05/25/18 2132)  BP: 111/62 (05/26/18 0433)  SpO2: 97 % (05/26/18 0433) Vital Signs (24h Range):  Temp:  [98.1 °F (36.7 °C)-98.5 °F (36.9 °C)] 98.3 °F (36.8 °C)  Pulse:  [51-80] 51  Resp:  [10-16] 16  SpO2:  [96 %-100 %] 97 %  BP: (100-124)/(53-80) 111/62     Weight: 105.8 kg (233 lb 4 oz)  Body mass index is 37.65 kg/m².  Patient's last menstrual period was 05/06/2018.    I&O (Last 24H):    Intake/Output Summary (Last 24 hours) at 05/26/18 0642  Last data filed at 05/26/18 0616   Gross per 24 hour   Intake             2800 ml   Output             1820 ml   Net              980 ml       Physical Exam:   Constitutional: She is oriented to person, place, and time. She appears well-developed and well-nourished.    HENT:   Head: Normocephalic and atraumatic.    Eyes: EOM are normal.    Neck: Normal range of motion.    Cardiovascular: Normal rate, regular rhythm, normal heart sounds and intact distal pulses.     Pulmonary/Chest: Effort normal and breath sounds normal.         Abdominal: Soft. She exhibits no distension. Tenderness: appropriately tender to palpation. There is no rebound and no guarding.   Mildly hypoactive BS.  Incisions c/d/i.  No signs of active bleeding.  No signs of infection             Musculoskeletal: Normal range of motion.       Neurological: She is alert and oriented to person, place, and time. She has normal reflexes.    Skin: Skin is warm and dry.    Psychiatric: She has a normal mood and affect. Her behavior is normal. Thought content normal.       Laboratory:  CBC:   Recent Labs  Lab 05/26/18  0438   WBC 9.44   RBC 4.12   HGB 10.2*   HCT 31.3*      MCV 76*   MCH 24.8*   MCHC 32.6     BMP  Lab Results   Component Value Date     05/15/2018    K 3.7 05/15/2018     05/15/2018    CO2 23 05/15/2018    BUN 7 05/15/2018    CREATININE 0.6 05/15/2018    CALCIUM 8.9 05/15/2018    ANIONGAP 10 05/15/2018    ESTGFRAFRICA >60 05/15/2018    EGFRNONAA >60 05/15/2018         Diagnostic Results:  none

## 2018-05-26 NOTE — PLAN OF CARE
Problem: Patient Care Overview  Goal: Plan of Care Review  Outcome: Ongoing (interventions implemented as appropriate)  Pt in no distress on 3L NC, unable to perform IS because pt asleep. Will continue to monitor.

## 2018-05-26 NOTE — HOSPITAL COURSE
05/25/2018: To OR for scheduled surgery.  No intraop complications.  05/26/2018: POD#1.  No acute issues overnight.  Doing well.  Ambulating, voiding, tolerating PO.  Pt meeting post op milestones.  Plan to discharge home today with plans to follow up with Dr. Ponce.

## 2018-05-26 NOTE — PLAN OF CARE
Problem: Patient Care Overview  Goal: Plan of Care Review  Outcome: Ongoing (interventions implemented as appropriate)  Pt remains free from falls. Vitals were stable throughout the night on 2L NC. Positions self independently. Pain managed with IV  medications, no complaints of nausea. Bed in low position and call light within reach. Will continue to monitor.

## 2018-06-04 ENCOUNTER — OFFICE VISIT (OUTPATIENT)
Dept: OBSTETRICS AND GYNECOLOGY | Facility: CLINIC | Age: 53
End: 2018-06-04
Payer: COMMERCIAL

## 2018-06-04 ENCOUNTER — TELEPHONE (OUTPATIENT)
Dept: OBSTETRICS AND GYNECOLOGY | Facility: CLINIC | Age: 53
End: 2018-06-04

## 2018-06-04 VITALS
WEIGHT: 210 LBS | DIASTOLIC BLOOD PRESSURE: 82 MMHG | SYSTOLIC BLOOD PRESSURE: 118 MMHG | HEIGHT: 66 IN | BODY MASS INDEX: 33.75 KG/M2

## 2018-06-04 DIAGNOSIS — Z90.710 S/P LAPAROSCOPIC HYSTERECTOMY: Primary | ICD-10-CM

## 2018-06-04 DIAGNOSIS — R10.9 ABDOMINAL PAIN, UNSPECIFIED ABDOMINAL LOCATION: ICD-10-CM

## 2018-06-04 DIAGNOSIS — N76.0 BV (BACTERIAL VAGINOSIS): ICD-10-CM

## 2018-06-04 DIAGNOSIS — R35.0 URINARY FREQUENCY: ICD-10-CM

## 2018-06-04 DIAGNOSIS — R10.84 GENERALIZED ABDOMINAL PAIN: ICD-10-CM

## 2018-06-04 DIAGNOSIS — N92.6 IRREGULAR BLEEDING: ICD-10-CM

## 2018-06-04 DIAGNOSIS — B96.89 BV (BACTERIAL VAGINOSIS): ICD-10-CM

## 2018-06-04 PROCEDURE — 99024 POSTOP FOLLOW-UP VISIT: CPT | Mod: S$GLB,,, | Performed by: OBSTETRICS & GYNECOLOGY

## 2018-06-04 PROCEDURE — 99999 PR PBB SHADOW E&M-EST. PATIENT-LVL III: CPT | Mod: PBBFAC,,, | Performed by: OBSTETRICS & GYNECOLOGY

## 2018-06-04 RX ORDER — TINIDAZOLE 500 MG/1
500 TABLET ORAL 2 TIMES DAILY
Qty: 14 TABLET | Refills: 0 | Status: SHIPPED | OUTPATIENT
Start: 2018-06-04 | End: 2018-06-09

## 2018-06-04 RX ORDER — NITROFURANTOIN 25; 75 MG/1; MG/1
100 CAPSULE ORAL 2 TIMES DAILY
Qty: 14 CAPSULE | Refills: 0 | Status: SHIPPED | OUTPATIENT
Start: 2018-06-04 | End: 2018-06-11

## 2018-06-04 NOTE — PROGRESS NOTES
HISTORY OF PRESENT ILLNESS:    Karli Samuel is a 53 y.o. female  Patient's last menstrual period was 2018. presents today  Reporting vaginal bleeding which began yesterday. Patient is s/p TLH on 2018. She reports rectal pain with BM and abdominal pain when drinking cold liquids. Patient denies N&V, no F&C, no trauma or strenuous activity. She does report increased urinary frequency. Patient has taken one pill for pain since hospital discharge,     FINAL PATHOLOGIC DIAGNOSIS  UTERUS, 334g: CERVIX-NO SIGNIFICANT HISTOLOGICAL ABNORMALITY, NO EVIDENCE OF DYSPLASIA;  ENDOMETRIUM-SECRETORY PHASE;  MYOMETRIUM-LEIOMYOMATA; ADENOMYOSIS;  SEROSA-NO SIGNIFICANT HISTOLOGICAL ABNORMALITY.  FALLOPIAN TUBES (2): PERITUBAL CYST; NO OTHER SIGNIFICANT HISTOLOGICAL ABNORMALITY.    Past Medical History:   Diagnosis Date    Acid reflux     Anemia        Past Surgical History:   Procedure Laterality Date    COLONOSCOPY      CYSTOSCOPY N/A 2018    Procedure: CYSTOSCOPY;  Surgeon: Talia Ponce MD;  Location: [facility];  Service: OB/GYN;  Laterality: N/A;    ESOPHAGOGASTRODUODENOSCOPY      LAPAROSCOPIC SALPINGECTOMY Bilateral 2018    Procedure: SALPINGECTOMY, LAPAROSCOPIC;  Surgeon: Talia Ponce MD;  Location: [facility];  Service: OB/GYN;  Laterality: Bilateral;    LAPAROSCOPIC TOTAL HYSTERECTOMY N/A 2018    Procedure: HYSTERECTOMY-TOTAL LAPAROSCOPIC (TL);  Surgeon: Talia Ponce MD;  Location: [facility];  Service: OB/GYN;  Laterality: N/A;       MEDICATIONS AND ALLERGIES:      Current Outpatient Prescriptions:     FERROUS SULFATE (IRON ORAL), Take 1 tablet by mouth 3 (three) times daily. , Disp: , Rfl:     oxyCODONE-acetaminophen (PERCOCET) 5-325 mg per tablet, Take 1 tablet by mouth every 4 (four) hours as needed., Disp: 30 tablet, Rfl: 0    nitrofurantoin, macrocrystal-monohydrate, (MACROBID) 100 MG capsule, Take 1 capsule (100 mg total) by mouth 2 (two) times  daily., Disp: 14 capsule, Rfl: 0    tinidazole (TINDAMAX) 500 MG tablet, Take 1 tablet (500 mg total) by mouth 2 (two) times daily., Disp: 14 tablet, Rfl: 0    Review of patient's allergies indicates:   Allergen Reactions    Pcn [penicillins] Anaphylaxis       History reviewed. No pertinent family history.    Social History     Social History    Marital status: Single     Spouse name: N/A    Number of children: N/A    Years of education: N/A     Occupational History    Not on file.     Social History Main Topics    Smoking status: Never Smoker    Smokeless tobacco: Never Used    Alcohol use No    Drug use: No    Sexual activity: Yes     Partners: Male     Other Topics Concern    Not on file     Social History Narrative    No narrative on file       COMPREHENSIVE GYN HISTORY:  PAP History: Denies abnormal Paps.  Infection History: Denies STDs. Denies PID.  Benign History: Denies uterine fibroids. Denies ovarian cysts. Denies endometriosis. Denies other conditions.  Cancer History: Denies cervical cancer. Denies uterine cancer or hyperplasia. Denies ovarian cancer. Denies vulvar cancer or pre-cancer. Denies vaginal cancer or pre-cancer. Denies breast cancer. Denies colon cancer.  Sexual Activity History: Reports currently being sexually active  Menstrual History: Every 28 days, flows for 4 days. Light flow.  Dysmenorrhea History: Denies dysmenorrhea.    ROS:  GENERAL: No weight changes. No swelling. No fatigue. No fever.  CARDIOVASCULAR: No chest pain. No shortness of breath. No leg cramps.   NEUROLOGICAL: No headaches. No vision changes.  BREASTS: No pain. No lumps. No discharge.  ABDOMEN: No pain. No nausea. No vomiting. No diarrhea. No constipation.  REPRODUCTIVE: No abnormal bleeding.   VULVA: No pain. No lesions. No itching.  VAGINA: No relaxation. No itching. No odor. No discharge. No lesions.  URINARY: No incontinence. No nocturia. No frequency. No dysuria.    PE:  APPEARANCE: Well nourished, well  developed, in no acute distress.  AFFECT: WNL, alert and oriented x 3.  ABDOMEN: Soft. No tenderness or masses. No hepatosplenomegaly. No hernias. Incision healing well. No evidence of infection. No rebound or guarding.   PELVIC: External female genitalia without lesions. Female hair distribution. Adequate perineal body, Normal urethral meatus. Vaginal cuff intact, sutures intact, no evidence of dehiscence.     DIAGNOSIS:  Post op visit   S/p TLH/BS  Abdominal pain - low suspicion for bowel injury   Urinary frequency  Abdominal pain   Rectal pain     FOLLOW-UP with me on Thursday   CT scan A&P  Macrobid   Tindamax (Patient did not complete Metrogel)  Pelvic rest   Strict precautions given

## 2018-06-05 ENCOUNTER — TELEPHONE (OUTPATIENT)
Dept: OBSTETRICS AND GYNECOLOGY | Facility: CLINIC | Age: 53
End: 2018-06-05

## 2018-06-05 ENCOUNTER — HOSPITAL ENCOUNTER (OUTPATIENT)
Dept: RADIOLOGY | Facility: OTHER | Age: 53
Discharge: HOME | End: 2018-06-05
Attending: OBSTETRICS & GYNECOLOGY
Payer: COMMERCIAL

## 2018-06-05 DIAGNOSIS — R10.9 ABDOMINAL PAIN, UNSPECIFIED ABDOMINAL LOCATION: ICD-10-CM

## 2018-06-05 DIAGNOSIS — Z90.710 S/P LAPAROSCOPIC HYSTERECTOMY: ICD-10-CM

## 2018-06-05 DIAGNOSIS — R10.84 GENERALIZED ABDOMINAL PAIN: ICD-10-CM

## 2018-06-05 PROCEDURE — 74177 CT ABD & PELVIS W/CONTRAST: CPT | Mod: TC

## 2018-06-05 PROCEDURE — 87088 URINE BACTERIA CULTURE: CPT

## 2018-06-05 PROCEDURE — 74177 CT ABD & PELVIS W/CONTRAST: CPT | Mod: 26,,, | Performed by: RADIOLOGY

## 2018-06-05 PROCEDURE — 87086 URINE CULTURE/COLONY COUNT: CPT

## 2018-06-05 PROCEDURE — 87186 SC STD MICRODIL/AGAR DIL: CPT

## 2018-06-05 PROCEDURE — 25500020 PHARM REV CODE 255: Performed by: OBSTETRICS & GYNECOLOGY

## 2018-06-05 PROCEDURE — 87077 CULTURE AEROBIC IDENTIFY: CPT

## 2018-06-05 RX ADMIN — IOHEXOL 100 ML: 350 INJECTION, SOLUTION INTRAVENOUS at 08:06

## 2018-06-05 RX ADMIN — IOHEXOL 30 ML: 350 INJECTION, SOLUTION INTRAVENOUS at 08:06

## 2018-06-05 NOTE — TELEPHONE ENCOUNTER
Spoke with patient about CT scan results.   She feels a lot better today. Strict precautions given.

## 2018-06-07 ENCOUNTER — TELEPHONE (OUTPATIENT)
Dept: OBSTETRICS AND GYNECOLOGY | Facility: CLINIC | Age: 53
End: 2018-06-07

## 2018-06-07 DIAGNOSIS — N39.0 URINARY TRACT INFECTION WITHOUT HEMATURIA, SITE UNSPECIFIED: Primary | ICD-10-CM

## 2018-06-07 LAB — BACTERIA UR CULT: NORMAL

## 2018-06-13 ENCOUNTER — OFFICE VISIT (OUTPATIENT)
Dept: OBSTETRICS AND GYNECOLOGY | Facility: CLINIC | Age: 53
End: 2018-06-13
Payer: COMMERCIAL

## 2018-06-13 VITALS
SYSTOLIC BLOOD PRESSURE: 110 MMHG | DIASTOLIC BLOOD PRESSURE: 80 MMHG | BODY MASS INDEX: 34.01 KG/M2 | HEIGHT: 66 IN | WEIGHT: 211.63 LBS

## 2018-06-13 DIAGNOSIS — Z90.710 S/P LAPAROSCOPIC HYSTERECTOMY: Primary | ICD-10-CM

## 2018-06-13 PROCEDURE — 99999 PR PBB SHADOW E&M-EST. PATIENT-LVL II: CPT | Mod: PBBFAC,,, | Performed by: OBSTETRICS & GYNECOLOGY

## 2018-06-13 PROCEDURE — 99024 POSTOP FOLLOW-UP VISIT: CPT | Mod: S$GLB,,, | Performed by: OBSTETRICS & GYNECOLOGY

## 2018-06-13 RX ORDER — METRONIDAZOLE 7.5 MG/G
GEL VAGINAL
Refills: 0 | COMMUNITY
Start: 2018-05-18 | End: 2018-07-12 | Stop reason: SDUPTHER

## 2018-06-18 NOTE — PROGRESS NOTES
HISTORY OF PRESENT ILLNESS:    Karli Samuel is a 53 y.o. female  Patient's last menstrual period was 2018. presents today    Patient reports that vaginal bleeding has decreased significantly, occasional red spotting with wiping.   Patient is s/p TLH on 2018. She reports rectal pain with BM and abdominal pain when drinking cold liquids. Patient denies N&V, no F&C, no trauma or strenuous activity. CT scan WNL with postop changes.     FINAL PATHOLOGIC DIAGNOSIS  UTERUS, 334g: CERVIX-NO SIGNIFICANT HISTOLOGICAL ABNORMALITY, NO EVIDENCE OF DYSPLASIA;  ENDOMETRIUM-SECRETORY PHASE;  MYOMETRIUM-LEIOMYOMATA; ADENOMYOSIS;  SEROSA-NO SIGNIFICANT HISTOLOGICAL ABNORMALITY.  FALLOPIAN TUBES (2): PERITUBAL CYST; NO OTHER SIGNIFICANT HISTOLOGICAL ABNORMALITY.      Narrative     EXAMINATION:  CT ABDOMEN PELVIS WITH CONTRAST    CLINICAL HISTORY:  Abdominal pain, unspecified; Generalized abdominal pain    TECHNIQUE:  Low dose axial images, sagittal and coronal reformations were obtained from the lung bases to the pubic symphysis following the IV administration of 100 mL of Omnipaque 350 and the oral administration of 30 mL of Omnipaque 350.    COMPARISON:  Ultrasound pelvis 2018    FINDINGS:  Visualized lower chest shows small right pleural effusion.  No free air is seen in the abdomen.  Spleen is normal with a small splenule at the inferior tip.  The liver, gallbladder, bile ducts, portal vein, and pancreas appear normal.    Adrenal glands are normal.  Both kidneys are normal size and excrete contrast satisfactorily without evidence of stone, obstruction, or solid mass.  Ureters are normal caliber.  Wall of the urinary bladder is smooth.  A few air bubbles are present within the bladder; most often this indicates recent instrumentation.  Otherwise, infection or communication to bowel would have to be considered.    Postoperative changes are seen from recent hysterectomy and salpingectomy.  The  ovaries look to remain in place and are upper normal size.  The operative region shows slight haziness of fat planes and small amounts of unorganized fluid.    Abdominal aorta appears normal with all major branches patent.  No significant ascites or lymph node enlargement is seen in the abdomen pelvis.    The intestinal tract shows no obstruction or acute inflammatory process.  Small bowel is normal caliber.  Normal appendix is visualized.  Stool is present throughout the colon.    Skeletal structures are intact with some degenerative changes noted at the lower two lumbar disc spaces.  Body wall shows a fat containing umbilical hernia.   Impression       1. Postoperative findings from recent hysterectomy.  Operative region shows a small amount of unorganized fluid and distortion of fat planes, as expected from recent surgery.  2. Small right pleural effusion  3. Air in urinary bladder, likely from recent catheterization.  Correlate clinically.  4. Other findings as above.      Electronically signed by: Angel Roberts MD  Date: 06/05/2018  Time: 09:09         Past Medical History:   Diagnosis Date    Acid reflux     Anemia        Past Surgical History:   Procedure Laterality Date    COLONOSCOPY      CYSTOSCOPY N/A 5/25/2018    Procedure: CYSTOSCOPY;  Surgeon: Talia Ponce MD;  [facility];  Service: OB/GYN;  Laterality: N/A;    ESOPHAGOGASTRODUODENOSCOPY      LAPAROSCOPIC SALPINGECTOMY Bilateral 5/25/2018    Procedure: SALPINGECTOMY, LAPAROSCOPIC;  Surgeon: Talia Ponce MD;  Location: [facility] OR;  Service: OB/GYN;  Laterality: Bilateral;    LAPAROSCOPIC TOTAL HYSTERECTOMY N/A 5/25/2018    Procedure: HYSTERECTOMY-TOTAL LAPAROSCOPIC (TLH);  Surgeon: Talia Ponce MD;  Location: [facility] OR;  Service: OB/GYN;  Laterality: N/A;       MEDICATIONS AND ALLERGIES:      Current Outpatient Prescriptions:     FERROUS SULFATE (IRON ORAL), Take 1 tablet by mouth 3 (three) times daily. , Disp: ,  Rfl:     oxyCODONE-acetaminophen (PERCOCET) 5-325 mg per tablet, Take 1 tablet by mouth every 4 (four) hours as needed., Disp: 30 tablet, Rfl: 0    metroNIDAZOLE (METROGEL) 0.75 % vaginal gel, INSERT VAGINALLY 1 APPLICATORFUL IN THE EVENING FOR 5 DAYS, Disp: , Rfl: 0    Review of patient's allergies indicates:   Allergen Reactions    Pcn [penicillins] Anaphylaxis       History reviewed. No pertinent family history.    Social History     Social History    Marital status: Single     Spouse name: N/A    Number of children: N/A    Years of education: N/A     Occupational History    Not on file.     Social History Main Topics    Smoking status: Never Smoker    Smokeless tobacco: Never Used    Alcohol use No    Drug use: No    Sexual activity: Yes     Partners: Male     Other Topics Concern    Not on file     Social History Narrative    No narrative on file       COMPREHENSIVE GYN HISTORY:  PAP History: Denies abnormal Paps.  Infection History: Denies STDs. Denies PID.  Benign History: Denies uterine fibroids. Denies ovarian cysts. Denies endometriosis. Denies other conditions.  Cancer History: Denies cervical cancer. Denies uterine cancer or hyperplasia. Denies ovarian cancer. Denies vulvar cancer or pre-cancer. Denies vaginal cancer or pre-cancer. Denies breast cancer. Denies colon cancer.  Sexual Activity History: Reports currently being sexually active  Menstrual History: Every 28 days, flows for 4 days. Light flow.  Dysmenorrhea History: Denies dysmenorrhea.    ROS:  GENERAL: No weight changes. No swelling. No fatigue. No fever.  CARDIOVASCULAR: No chest pain. No shortness of breath. No leg cramps.   NEUROLOGICAL: No headaches. No vision changes.  BREASTS: No pain. No lumps. No discharge.  ABDOMEN: No pain. No nausea. No vomiting. No diarrhea. No constipation.  REPRODUCTIVE: No abnormal bleeding.   VULVA: No pain. No lesions. No itching.  VAGINA: No relaxation. No itching. No odor. No discharge. No  lesions.  URINARY: No incontinence. No nocturia. No frequency. No dysuria.    PE:  APPEARANCE: Well nourished, well developed, in no acute distress.  AFFECT: WNL, alert and oriented x 3.  ABDOMEN: Soft. No tenderness or masses. No hepatosplenomegaly. No hernias. Incision healing well. No evidence of infection. No rebound or guarding.   PELVIC: Deferred    DIAGNOSIS:  Post op visit   S/p TLH/BS  Abdominal pain - low suspicion for bowel injury   Urinary frequency  Abdominal pain   Rectal pain     FOLLOW-UP with me in 4 weeks   Pelvic rest   Strict precautions given

## 2018-06-25 ENCOUNTER — TELEPHONE (OUTPATIENT)
Dept: OBSTETRICS AND GYNECOLOGY | Facility: CLINIC | Age: 53
End: 2018-06-25

## 2018-06-25 NOTE — TELEPHONE ENCOUNTER
Patient called to check on status of [employer] paperwork for her job. Informed her that I was not at the [facility] location and will check fax for paperwork.

## 2018-06-25 NOTE — TELEPHONE ENCOUNTER
----- Message from Janet Samuel sent at 6/25/2018  1:40 PM CDT -----  Contact: self  Pt is returning a phone call. The pt can be reached at [phone]

## 2018-06-25 NOTE — TELEPHONE ENCOUNTER
----- Message from Min Vásquez sent at 6/25/2018 11:19 AM CDT -----  Contact: pt            Name of Who is Calling: pt      What is the request in detail: in regards to medical document that is needing for her employer       Can the clinic reply by [facility]: no      [facility] [phone]

## 2018-07-09 ENCOUNTER — LAB VISIT (OUTPATIENT)
Dept: LAB | Facility: HOSPITAL | Age: 53
End: 2018-07-09
Attending: OBSTETRICS & GYNECOLOGY
Payer: COMMERCIAL

## 2018-07-09 ENCOUNTER — OFFICE VISIT (OUTPATIENT)
Dept: OBSTETRICS AND GYNECOLOGY | Facility: CLINIC | Age: 53
End: 2018-07-09
Payer: COMMERCIAL

## 2018-07-09 VITALS
BODY MASS INDEX: 35.08 KG/M2 | DIASTOLIC BLOOD PRESSURE: 86 MMHG | WEIGHT: 218.25 LBS | HEIGHT: 66 IN | SYSTOLIC BLOOD PRESSURE: 128 MMHG

## 2018-07-09 DIAGNOSIS — Z90.710 S/P LAPAROSCOPIC HYSTERECTOMY: ICD-10-CM

## 2018-07-09 DIAGNOSIS — N89.8 VAGINAL DISCHARGE: ICD-10-CM

## 2018-07-09 DIAGNOSIS — Z11.3 SCREEN FOR STD (SEXUALLY TRANSMITTED DISEASE): Primary | ICD-10-CM

## 2018-07-09 PROCEDURE — 86592 SYPHILIS TEST NON-TREP QUAL: CPT

## 2018-07-09 PROCEDURE — 87491 CHLMYD TRACH DNA AMP PROBE: CPT

## 2018-07-09 PROCEDURE — 36415 COLL VENOUS BLD VENIPUNCTURE: CPT

## 2018-07-09 PROCEDURE — 99999 PR PBB SHADOW E&M-EST. PATIENT-LVL II: CPT | Mod: PBBFAC,,, | Performed by: OBSTETRICS & GYNECOLOGY

## 2018-07-09 PROCEDURE — 80074 ACUTE HEPATITIS PANEL: CPT

## 2018-07-09 PROCEDURE — 87660 TRICHOMONAS VAGIN DIR PROBE: CPT

## 2018-07-09 PROCEDURE — 99024 POSTOP FOLLOW-UP VISIT: CPT | Mod: S$GLB,,, | Performed by: OBSTETRICS & GYNECOLOGY

## 2018-07-09 PROCEDURE — 86703 HIV-1/HIV-2 1 RESULT ANTBDY: CPT

## 2018-07-10 ENCOUNTER — PATIENT MESSAGE (OUTPATIENT)
Dept: ADMINISTRATIVE | Facility: OTHER | Age: 53
End: 2018-07-10

## 2018-07-10 LAB
C TRACH DNA SPEC QL NAA+PROBE: NOT DETECTED
CANDIDA RRNA VAG QL PROBE: NEGATIVE
G VAGINALIS RRNA GENITAL QL PROBE: POSITIVE
HAV IGM SERPL QL IA: NEGATIVE
HBV CORE IGM SERPL QL IA: NEGATIVE
HBV SURFACE AG SERPL QL IA: NEGATIVE
HCV AB SERPL QL IA: NEGATIVE
HIV 1+2 AB+HIV1 P24 AG SERPL QL IA: NEGATIVE
N GONORRHOEA DNA SPEC QL NAA+PROBE: NOT DETECTED
RPR SER QL: NORMAL
T VAGINALIS RRNA GENITAL QL PROBE: NEGATIVE

## 2018-07-12 ENCOUNTER — PATIENT MESSAGE (OUTPATIENT)
Dept: OBSTETRICS AND GYNECOLOGY | Facility: CLINIC | Age: 53
End: 2018-07-12

## 2018-07-13 RX ORDER — METRONIDAZOLE 7.5 MG/G
GEL VAGINAL
Qty: 70 G | Refills: 0 | Status: SHIPPED | OUTPATIENT
Start: 2018-07-13 | End: 2018-11-20

## 2018-07-14 NOTE — PROGRESS NOTES
HISTORY OF PRESENT ILLNESS:    Karli Samuel is a 53 y.o. female  Patient's last menstrual period was 2018. presents today.   Report shaving sex after a night of drinking and partner stated that he forgot that they couldn't have sex.   Patient also desires STD testing.     FINAL PATHOLOGIC DIAGNOSIS  UTERUS, 334g: CERVIX-NO SIGNIFICANT HISTOLOGICAL ABNORMALITY, NO EVIDENCE OF DYSPLASIA;  ENDOMETRIUM-SECRETORY PHASE;  MYOMETRIUM-LEIOMYOMATA; ADENOMYOSIS;  SEROSA-NO SIGNIFICANT HISTOLOGICAL ABNORMALITY.  FALLOPIAN TUBES (2): PERITUBAL CYST; NO OTHER SIGNIFICANT HISTOLOGICAL ABNORMALITY.    Past Medical History:   Diagnosis Date    Acid reflux     Anemia        Past Surgical History:   Procedure Laterality Date    COLONOSCOPY      CYSTOSCOPY N/A 2018    Procedure: CYSTOSCOPY;  Surgeon: Talia Ponce MD;  Location: [facility];  Service: OB/GYN;  Laterality: N/A;    ESOPHAGOGASTRODUODENOSCOPY      HYSTERECTOMY      LAPAROSCOPIC SALPINGECTOMY Bilateral 2018    Procedure: SALPINGECTOMY, LAPAROSCOPIC;  Surgeon: Talia Ponce MD;  Location: [facility];  Service: OB/GYN;  Laterality: Bilateral;    LAPAROSCOPIC TOTAL HYSTERECTOMY N/A 2018    Procedure: HYSTERECTOMY-TOTAL LAPAROSCOPIC (TLH);  Surgeon: Talia Ponce MD;  Location: [facility];  Service: OB/GYN;  Laterality: N/A;       MEDICATIONS AND ALLERGIES:      Current Outpatient Prescriptions:     FERROUS SULFATE (IRON ORAL), Take 1 tablet by mouth 3 (three) times daily. , Disp: , Rfl:     metroNIDAZOLE (METROGEL) 0.75 % vaginal gel, INSERT VAGINALLY 1 APPLICATORFUL IN THE EVENING FOR 5 DAYS, Disp: 70 g, Rfl: 0    Review of patient's allergies indicates:   Allergen Reactions    Pcn [penicillins] Anaphylaxis       No family history on file.    Social History     Social History    Marital status: Single     Spouse name: N/A    Number of children: N/A    Years of education: N/A     Occupational History    Not  on file.     Social History Main Topics    Smoking status: Never Smoker    Smokeless tobacco: Never Used    Alcohol use No    Drug use: No    Sexual activity: Yes     Partners: Male     Other Topics Concern    Not on file     Social History Narrative    No narrative on file       COMPREHENSIVE GYN HISTORY:  PAP History: Denies abnormal Paps.  Infection History: Denies STDs. Denies PID.  Benign History: Denies uterine fibroids. Denies ovarian cysts. Denies endometriosis. Denies other conditions.  Cancer History: Denies cervical cancer. Denies uterine cancer or hyperplasia. Denies ovarian cancer. Denies vulvar cancer or pre-cancer. Denies vaginal cancer or pre-cancer. Denies breast cancer. Denies colon cancer.  Sexual Activity History: Reports currently being sexually active  Menstrual History: Every 28 days, flows for 4 days. Light flow.  Dysmenorrhea History: Denies dysmenorrhea.    ROS:  GENERAL: No weight changes. No swelling. No fatigue. No fever.  CARDIOVASCULAR: No chest pain. No shortness of breath. No leg cramps.   NEUROLOGICAL: No headaches. No vision changes.  BREASTS: No pain. No lumps. No discharge.  ABDOMEN: No pain. No nausea. No vomiting. No diarrhea. No constipation.  REPRODUCTIVE: No abnormal bleeding.   VULVA: No pain. No lesions. No itching.  VAGINA: No relaxation. No itching. No odor. No discharge. No lesions.  URINARY: No incontinence. No nocturia. No frequency. No dysuria.    PE:  APPEARANCE: Well nourished, well developed, in no acute distress.  AFFECT: WNL, alert and oriented x 3.  ABDOMEN: Soft. No tenderness or masses. No hepatosplenomegaly. No hernias. Incision healing well. No evidence of infection. No rebound or guarding.   PELVIC: Vaginal cuff intact. Cultures performed     DIAGNOSIS:  Post op visit   S/p TLH/BS    FOLLOW-UP with me in 3 weeks   Pelvic rest   Strict precautions given

## 2018-08-10 ENCOUNTER — OFFICE VISIT (OUTPATIENT)
Dept: OBSTETRICS AND GYNECOLOGY | Facility: CLINIC | Age: 53
End: 2018-08-10
Payer: COMMERCIAL

## 2018-08-10 VITALS
HEIGHT: 66 IN | DIASTOLIC BLOOD PRESSURE: 78 MMHG | WEIGHT: 216.06 LBS | SYSTOLIC BLOOD PRESSURE: 110 MMHG | BODY MASS INDEX: 34.72 KG/M2

## 2018-08-10 DIAGNOSIS — Z90.710 S/P LAPAROSCOPIC HYSTERECTOMY: Primary | ICD-10-CM

## 2018-08-10 PROCEDURE — 99024 POSTOP FOLLOW-UP VISIT: CPT | Mod: S$GLB,,, | Performed by: OBSTETRICS & GYNECOLOGY

## 2018-08-10 PROCEDURE — 99999 PR PBB SHADOW E&M-EST. PATIENT-LVL II: CPT | Mod: PBBFAC,,, | Performed by: OBSTETRICS & GYNECOLOGY

## 2018-08-10 RX ORDER — AZITHROMYCIN 250 MG/1
TABLET, FILM COATED ORAL
Refills: 0 | COMMUNITY
Start: 2018-07-13 | End: 2018-11-20

## 2018-08-12 NOTE — PROGRESS NOTES
HISTORY OF PRESENT ILLNESS:    Karli Samuel is a 53 y.o. female  Patient's last menstrual period was 2018. presents today.   Patient has returned to work.     FINAL PATHOLOGIC DIAGNOSIS  UTERUS, 334g: CERVIX-NO SIGNIFICANT HISTOLOGICAL ABNORMALITY, NO EVIDENCE OF DYSPLASIA;  ENDOMETRIUM-SECRETORY PHASE;  MYOMETRIUM-LEIOMYOMATA; ADENOMYOSIS;  SEROSA-NO SIGNIFICANT HISTOLOGICAL ABNORMALITY.  FALLOPIAN TUBES (2): PERITUBAL CYST; NO OTHER SIGNIFICANT HISTOLOGICAL ABNORMALITY.    Past Medical History:   Diagnosis Date    Acid reflux     Anemia        Past Surgical History:   Procedure Laterality Date    COLONOSCOPY      CYSTOSCOPY N/A 2018    Procedure: CYSTOSCOPY;  Surgeon: Talia Ponce MD;  Location: [facility];  Service: OB/GYN;  Laterality: N/A;    ESOPHAGOGASTRODUODENOSCOPY      HYSTERECTOMY      LAPAROSCOPIC SALPINGECTOMY Bilateral 2018    Procedure: SALPINGECTOMY, LAPAROSCOPIC;  Surgeon: Talia Ponce MD;  Location: [facility] OR;  Service: OB/GYN;  Laterality: Bilateral;    LAPAROSCOPIC TOTAL HYSTERECTOMY N/A 2018    Procedure: HYSTERECTOMY-TOTAL LAPAROSCOPIC (TLH);  Surgeon: Talia Ponce MD;  Location: [facility];  Service: OB/GYN;  Laterality: N/A;       MEDICATIONS AND ALLERGIES:      Current Outpatient Prescriptions:     azithromycin (Z-SIMA) 250 MG tablet, , Disp: , Rfl: 0    FERROUS SULFATE (IRON ORAL), Take 1 tablet by mouth 3 (three) times daily. , Disp: , Rfl:     metroNIDAZOLE (METROGEL) 0.75 % vaginal gel, INSERT VAGINALLY 1 APPLICATORFUL IN THE EVENING FOR 5 DAYS, Disp: 70 g, Rfl: 0    Review of patient's allergies indicates:   Allergen Reactions    Pcn [penicillins] Anaphylaxis       History reviewed. No pertinent family history.    Social History     Social History    Marital status: Single     Spouse name: N/A    Number of children: N/A    Years of education: N/A     Occupational History    Not on file.     Social History Main  Topics    Smoking status: Never Smoker    Smokeless tobacco: Never Used    Alcohol use No    Drug use: No    Sexual activity: Yes     Partners: Male     Other Topics Concern    Not on file     Social History Narrative    No narrative on file       COMPREHENSIVE GYN HISTORY:  PAP History: Denies abnormal Paps.  Infection History: Denies STDs. Denies PID.  Benign History: Denies uterine fibroids. Denies ovarian cysts. Denies endometriosis. Denies other conditions.  Cancer History: Denies cervical cancer. Denies uterine cancer or hyperplasia. Denies ovarian cancer. Denies vulvar cancer or pre-cancer. Denies vaginal cancer or pre-cancer. Denies breast cancer. Denies colon cancer.  Sexual Activity History: Reports currently being sexually active  Menstrual History: Every 28 days, flows for 4 days. Light flow.  Dysmenorrhea History: Denies dysmenorrhea.    ROS:  GENERAL: No weight changes. No swelling. No fatigue. No fever.  CARDIOVASCULAR: No chest pain. No shortness of breath. No leg cramps.   NEUROLOGICAL: No headaches. No vision changes.  BREASTS: No pain. No lumps. No discharge.  ABDOMEN: No pain. No nausea. No vomiting. No diarrhea. No constipation.  REPRODUCTIVE: No abnormal bleeding.   VULVA: No pain. No lesions. No itching.  VAGINA: No relaxation. No itching. No odor. No discharge. No lesions.  URINARY: No incontinence. No nocturia. No frequency. No dysuria.    PE:  APPEARANCE: Well nourished, well developed, in no acute distress.  AFFECT: WNL, alert and oriented x 3.  ABDOMEN: Soft. No tenderness or masses. No hepatosplenomegaly. No hernias. Incision healing well. No evidence of infection. No rebound or guarding.   PELVIC: Vaginal cuff intact and well healed.     DIAGNOSIS:  Post op visit   S/p TLH/BS    FOLLOW-UP with me in 3 months     RTW full time  note writtenr

## 2018-11-15 ENCOUNTER — TELEPHONE (OUTPATIENT)
Dept: OBSTETRICS AND GYNECOLOGY | Facility: CLINIC | Age: 53
End: 2018-11-15

## 2018-11-15 NOTE — TELEPHONE ENCOUNTER
----- Message from Janet Samuel sent at 11/15/2018 10:51 AM CST -----  Contact: SELF   Pt is needing to schedule a follow up for a laparoscopic hysterectomy . The pt can be reached at [phone].

## 2018-11-20 ENCOUNTER — OFFICE VISIT (OUTPATIENT)
Dept: OBSTETRICS AND GYNECOLOGY | Facility: CLINIC | Age: 53
End: 2018-11-20
Payer: COMMERCIAL

## 2018-11-20 VITALS
WEIGHT: 213.19 LBS | SYSTOLIC BLOOD PRESSURE: 114 MMHG | BODY MASS INDEX: 34.26 KG/M2 | HEIGHT: 66 IN | DIASTOLIC BLOOD PRESSURE: 86 MMHG

## 2018-11-20 DIAGNOSIS — Z90.710 S/P LAPAROSCOPIC HYSTERECTOMY: Primary | ICD-10-CM

## 2018-11-20 DIAGNOSIS — S39.011A STRAIN OF ABDOMINAL MUSCLE, INITIAL ENCOUNTER: ICD-10-CM

## 2018-11-20 PROCEDURE — 3008F BODY MASS INDEX DOCD: CPT | Mod: CPTII,S$GLB,, | Performed by: OBSTETRICS & GYNECOLOGY

## 2018-11-20 PROCEDURE — 99213 OFFICE O/P EST LOW 20 MIN: CPT | Mod: S$GLB,,, | Performed by: OBSTETRICS & GYNECOLOGY

## 2018-11-20 PROCEDURE — 99999 PR PBB SHADOW E&M-EST. PATIENT-LVL II: CPT | Mod: PBBFAC,,, | Performed by: OBSTETRICS & GYNECOLOGY

## 2018-11-20 NOTE — PROGRESS NOTES
HISTORY OF PRESENT ILLNESS:    Karli Samuel is a 53 y.o. female  Patient's last menstrual period was 2018. presents today complaining of abdominal pain while working. Patient is s/p H 2018 , [employer] 2108 on light duty with RTW full duty 8/10/2018. 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 Missed 3 days from work after this time.   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      Past Medical History:   Diagnosis Date    Acid reflux     Anemia        Past Surgical History:   Procedure Laterality Date    COLONOSCOPY      CYSTOSCOPY N/A 2018    Procedure: CYSTOSCOPY;  Surgeon: Talia Ponce MD;  Location: [facility] OR;  Service: OB/GYN;  Laterality: N/A;    CYSTOSCOPY N/A 2018    Performed by Talia Ponce MD at [facility] OR    ESOPHAGOGASTRODUODENOSCOPY      HYSTERECTOMY      HYSTERECTOMY-TOTAL LAPAROSCOPIC (TLH) N/A 2018    Performed by Talia Ponce MD at [facility] OR    LAPAROSCOPIC SALPINGECTOMY Bilateral 2018    Procedure: SALPINGECTOMY, LAPAROSCOPIC;  Surgeon: Talia Ponce MD;  Location: [facility] OR;  Service: OB/GYN;  Laterality: Bilateral;    LAPAROSCOPIC TOTAL HYSTERECTOMY N/A 2018    Procedure: HYSTERECTOMY-TOTAL LAPAROSCOPIC ([facility]);  Surgeon: Talia Ponce MD;  Location: [facility] OR;  Service: OB/GYN;  Laterality: N/A;    SALPINGECTOMY, LAPAROSCOPIC Bilateral 2018    Performed by Talia Ponce MD at [facility] OR       MEDICATIONS AND ALLERGIES:      Current Outpatient Medications:     FERROUS SULFATE (IRON ORAL), Take 1 tablet by mouth 3 (three) times daily. , Disp: , Rfl:     Review of patient's allergies indicates:   Allergen Reactions    Pcn [penicillins] Anaphylaxis       COMPREHENSIVE GYN HISTORY:  PAP History: Denies abnormal Paps.  Infection History: Denies STDs. Denies PID.  Benign History: Denies uterine fibroids. Denies ovarian  cysts. Denies endometriosis. Denies other conditions.  Cancer History: Denies cervical cancer. Denies uterine cancer or hyperplasia. Denies ovarian cancer. Denies vulvar cancer or pre-cancer. Denies vaginal cancer or pre-cancer. Denies breast cancer. Denies colon cancer.  Sexual Activity History: Reports currently being sexually active  Menstrual History: Every 28 days, flows for 4 days. Light flow.  Dysmenorrhea History: Denies dysmenorrhea.  Contraception History:      ROS:  GENERAL: No fever or chills.  BREASTS: No pain. No lumps. No discharge.  ABDOMEN: No pain. No nausea. No vomiting. No diarrhea. No constipation.  REPRODUCTIVE: No abnormal bleeding.   VULVA: No pain. No lesions. No itching.  VAGINA: No relaxation. No itching. No odor. No discharge. No lesions.  URINARY: No incontinence. No nocturia. No frequency. No dysuria.    PE:  APPEARANCE: Well nourished, well developed, in no acute distress.  AFFECT: WNL, alert and oriented x 3.  ABDOMEN: Soft. No tenderness or masses. No hepatosplenomegaly. No hernias.  PELVIC : Deferred     1. S/P laparoscopic hysterectomy/BS/cysto    2. Strain of abdominal muscle, initial encounter        COUNSELING:  The patient was counseled today as above    FOLLOW-UP with me in 6-12 weeks   Letter written for work

## 2019-09-05 ENCOUNTER — TELEPHONE (OUTPATIENT)
Dept: OBSTETRICS AND GYNECOLOGY | Facility: CLINIC | Age: 54
End: 2019-09-05

## 2019-09-05 NOTE — TELEPHONE ENCOUNTER
----- Message from Amanda Dunn sent at 9/5/2019 12:13 PM CDT -----  Contact: Patient   Patient called to schedule an appointment for extreme pain in the pelvic area. The patient would like to be seen as soon as possible for this issue by provider. The patient can be reached at [phone].

## 2019-09-09 ENCOUNTER — OFFICE VISIT (OUTPATIENT)
Dept: OBSTETRICS AND GYNECOLOGY | Facility: CLINIC | Age: 54
End: 2019-09-09
Payer: COMMERCIAL

## 2019-09-09 VITALS
WEIGHT: 225.75 LBS | HEIGHT: 66 IN | DIASTOLIC BLOOD PRESSURE: 82 MMHG | BODY MASS INDEX: 36.28 KG/M2 | SYSTOLIC BLOOD PRESSURE: 136 MMHG

## 2019-09-09 DIAGNOSIS — R30.0 DYSURIA: ICD-10-CM

## 2019-09-09 DIAGNOSIS — R10.2 PELVIC PAIN: Primary | ICD-10-CM

## 2019-09-09 DIAGNOSIS — K59.00 CONSTIPATION, UNSPECIFIED CONSTIPATION TYPE: ICD-10-CM

## 2019-09-09 PROCEDURE — 3008F BODY MASS INDEX DOCD: CPT | Mod: CPTII,S$GLB,, | Performed by: STUDENT IN AN ORGANIZED HEALTH CARE EDUCATION/TRAINING PROGRAM

## 2019-09-09 PROCEDURE — 99213 OFFICE O/P EST LOW 20 MIN: CPT | Mod: S$GLB,,, | Performed by: STUDENT IN AN ORGANIZED HEALTH CARE EDUCATION/TRAINING PROGRAM

## 2019-09-09 PROCEDURE — 99999 PR PBB SHADOW E&M-EST. PATIENT-LVL III: CPT | Mod: PBBFAC,,, | Performed by: STUDENT IN AN ORGANIZED HEALTH CARE EDUCATION/TRAINING PROGRAM

## 2019-09-09 PROCEDURE — 99999 PR PBB SHADOW E&M-EST. PATIENT-LVL III: ICD-10-PCS | Mod: PBBFAC,,, | Performed by: STUDENT IN AN ORGANIZED HEALTH CARE EDUCATION/TRAINING PROGRAM

## 2019-09-09 PROCEDURE — 87801 DETECT AGNT MULT DNA AMPLI: CPT

## 2019-09-09 PROCEDURE — 3008F PR BODY MASS INDEX (BMI) DOCUMENTED: ICD-10-PCS | Mod: CPTII,S$GLB,, | Performed by: STUDENT IN AN ORGANIZED HEALTH CARE EDUCATION/TRAINING PROGRAM

## 2019-09-09 PROCEDURE — 87491 CHLMYD TRACH DNA AMP PROBE: CPT | Mod: 59

## 2019-09-09 PROCEDURE — 99213 PR OFFICE/OUTPT VISIT, EST, LEVL III, 20-29 MIN: ICD-10-PCS | Mod: S$GLB,,, | Performed by: STUDENT IN AN ORGANIZED HEALTH CARE EDUCATION/TRAINING PROGRAM

## 2019-09-09 PROCEDURE — 87481 CANDIDA DNA AMP PROBE: CPT | Mod: 59

## 2019-09-09 RX ORDER — EPINEPHRINE 0.3 MG/.3ML
0.3 INJECTION SUBCUTANEOUS
COMMUNITY
Start: 2018-11-05

## 2019-09-09 RX ORDER — PNV NO.95/FERROUS FUM/FOLIC AC 28MG-0.8MG
1000 TABLET ORAL
COMMUNITY
End: 2021-05-13

## 2019-09-09 RX ORDER — ALBUTEROL SULFATE 90 UG/1
AEROSOL, METERED RESPIRATORY (INHALATION)
Refills: 0 | COMMUNITY
Start: 2019-07-01 | End: 2024-02-26

## 2019-09-09 NOTE — PATIENT INSTRUCTIONS
Constipation:  Controlling constipation may help bladder urgency/leakage and fiber may better control cholesterol and blood glucose.  Start daily fiber.  Take 1 tsp of fiber powder (psyllium or other sugar-free powder).  Mix in 8 oz of water.  Take x 3-5 days.  Then, increase fiber by 1 tsp every 3-5 days until stool is easy to pass.  Stop and continue at that dose.   Do not exceed 6 tsps/day.  May also use over the counter stool softener 1-2 x/day.  AVOID laxatives.

## 2019-09-09 NOTE — PROGRESS NOTES
"History & Physical  Gynecology      SUBJECTIVE:     Chief Complaint: Pelvic Pain (Burning with urination)       History of Present Illness:    One month midline pelvic pain, occurring once per week. "sticking pain." Comes on suddenly. Stays for a few seconds. Not having to treat with medications. 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 Not worsened by sex. Burns after completion of urination each time. BM every 3 days. Staying hydration. Started with fiber breakfast. S/p TLH/BS/cysto 2018 for AUB-L.       Review of patient's allergies indicates:   Allergen Reactions    Pcn [penicillins] Anaphylaxis    Phenazopyridine Shortness Of Breath    Amoxicillin Swelling       Past Medical History:   Diagnosis Date    Acid reflux     Anemia      Past Surgical History:   Procedure Laterality Date    COLONOSCOPY      CYSTOSCOPY N/A 2018    Performed by Talia Ponce MD at [facility] [place] OR    ESOPHAGOGASTRODUODENOSCOPY      HYSTERECTOMY  2018    HYSTERECTOM[facility]COPIC ([facility]) N/A 2018    Performed by Talia Ponce MD at [facility] [place] OR    SALPINGECTOMY, LAPAROSCOPIC Bilateral 2018    Performed by Talia Ponce MD at [facility] [place] OR     OB History        1    Para   1    Term   1            AB        Living   1       SAB        TAB        Ectopic        Multiple        Live Births   1               Family History   Problem Relation Age of Onset    Breast cancer Neg Hx     Colon cancer Neg Hx     Ovarian cancer Neg Hx      Social History     Tobacco Use    Smoking status: Never Smoker    Smokeless tobacco: Never Used   Substance Use Topics    Alcohol use: No    Drug use: No       Current Outpatient Medications   Medication Sig    albuterol (PROVENTIL/VENTOLIN HFA) 90 mcg/actuation inhaler TK 2 PUFFS INTO THE LUNGS Q 4 H PRN    EPINEPHrine (EPIPEN) 0.3 mg/0.3 mL AtIn Inject 0.3 mg into the muscle.    FERROUS SULFATE (IRON ORAL) Take 1 tablet by " mouth 3 (three) times daily.     vitamin E 1000 UNIT capsule Take 1,000 Units by mouth.     No current facility-administered medications for this visit.          Review of Systems:  Review of Systems   Constitutional: Negative for activity change, appetite change, chills and fever.   Gastrointestinal: Positive for abdominal pain and constipation. Negative for blood in stool, diarrhea and nausea.   Endocrine: Negative for diabetes.   Genitourinary: Positive for dysuria. Negative for dyspareunia, hematuria, vaginal bleeding, vaginal discharge, vaginal pain, postcoital bleeding, vaginal dryness and vaginal odor.   Musculoskeletal: Negative for back pain.        OBJECTIVE:     Physical Exam:  Physical Exam   Constitutional: She is oriented to person, place, and time. She appears well-developed and well-nourished. No distress.   HENT:   Head: Normocephalic and atraumatic.   Eyes: Conjunctivae are normal.   Neck: Normal range of motion.   Cardiovascular: Normal rate.   Pulmonary/Chest: Effort normal.   Genitourinary: There is no rash, tenderness, lesion or injury on the right labia. There is no rash, tenderness, lesion or injury on the left labia. Right adnexum displays no mass, no tenderness and no fullness. Left adnexum displays no mass, no tenderness and no fullness. No erythema, tenderness or bleeding in the vagina. No foreign body in the vagina. No signs of injury around the vagina. No vaginal discharge found.   Genitourinary Comments: Cuff intact. No uterus present. Cannot reproduce pain on pelvic exam today.   Musculoskeletal: Normal range of motion.   Neurological: She is alert and oriented to person, place, and time.   Skin: Skin is warm and dry. She is not diaphoretic.   Psychiatric: She has a normal mood and affect.   Vitals reviewed.        ASSESSMENT:       ICD-10-CM ICD-9-CM    1. Pelvic pain R10.2 [id number] POCT URINE DIPSTICK WITHOUT MICROSCOPE      Vaginosis Screen by DNA Probe      C. trachomatis/N.  gonorrhoeae by AMP DNA   2. Dysuria R30.0 788.1 POCT URINE DIPSTICK WITHOUT MICROSCOPE      Vaginosis Screen by DNA Probe      C. trachomatis/N. gonorrhoeae by AMP DNA   3. Constipation, unspecified constipation type K59.00 564.00           Plan:      Pelvic pain  - Udip negative  - Affirm and GC/CT collected per patient request  - Reviewed multiple possible etiologies for pain including GI, , MSK. Reviewed lifting precautions.  - Constipation regimen recommended  - Will monitor pain for now as it is not interfering with daily activity. RTC for worsening symptoms, otherwise due for mammogram/annual in March.    Counseling time: 15 minutes    Chula Box

## 2019-09-10 DIAGNOSIS — B96.89 BACTERIAL VAGINOSIS: Primary | ICD-10-CM

## 2019-09-10 DIAGNOSIS — N76.0 BACTERIAL VAGINOSIS: Primary | ICD-10-CM

## 2019-09-10 LAB
BACTERIAL VAGINOSIS DNA: POSITIVE
C TRACH DNA SPEC QL NAA+PROBE: NOT DETECTED
CANDIDA GLABRATA DNA: NEGATIVE
CANDIDA KRUSEI DNA: NEGATIVE
CANDIDA RRNA VAG QL PROBE: NEGATIVE
N GONORRHOEA DNA SPEC QL NAA+PROBE: NOT DETECTED
T VAGINALIS RRNA GENITAL QL PROBE: NEGATIVE

## 2019-09-10 RX ORDER — METRONIDAZOLE 500 MG/1
500 TABLET ORAL EVERY 12 HOURS
Qty: 14 TABLET | Refills: 0 | Status: SHIPPED | OUTPATIENT
Start: 2019-09-10 | End: 2019-09-17

## 2020-03-09 ENCOUNTER — OFFICE VISIT (OUTPATIENT)
Dept: OBSTETRICS AND GYNECOLOGY | Facility: CLINIC | Age: 55
End: 2020-03-09
Payer: COMMERCIAL

## 2020-03-09 VITALS
WEIGHT: 223.75 LBS | SYSTOLIC BLOOD PRESSURE: 124 MMHG | BODY MASS INDEX: 36.12 KG/M2 | DIASTOLIC BLOOD PRESSURE: 82 MMHG

## 2020-03-09 DIAGNOSIS — Z01.419 WELL WOMAN EXAM WITH ROUTINE GYNECOLOGICAL EXAM: Primary | ICD-10-CM

## 2020-03-09 DIAGNOSIS — N94.9 VAGINAL DISCOMFORT: ICD-10-CM

## 2020-03-09 DIAGNOSIS — Z90.710 S/P LAPAROSCOPIC HYSTERECTOMY: ICD-10-CM

## 2020-03-09 LAB
BILIRUB SERPL-MCNC: NEGATIVE MG/DL
BLOOD URINE, POC: NEGATIVE
COLOR, POC UA: YELLOW
GLUCOSE UR QL STRIP: NORMAL
KETONES UR QL STRIP: NEGATIVE
LEUKOCYTE ESTERASE URINE, POC: NEGATIVE
NITRITE, POC UA: NEGATIVE
PH, POC UA: 5
PROTEIN, POC: NEGATIVE
SPECIFIC GRAVITY, POC UA: 1
UROBILINOGEN, POC UA: NORMAL

## 2020-03-09 PROCEDURE — 81002 POCT URINE DIPSTICK WITHOUT MICROSCOPE: ICD-10-PCS | Mod: S$GLB,,, | Performed by: STUDENT IN AN ORGANIZED HEALTH CARE EDUCATION/TRAINING PROGRAM

## 2020-03-09 PROCEDURE — 87661 TRICHOMONAS VAGINALIS AMPLIF: CPT

## 2020-03-09 PROCEDURE — 99999 PR PBB SHADOW E&M-EST. PATIENT-LVL III: ICD-10-PCS | Mod: PBBFAC,,, | Performed by: STUDENT IN AN ORGANIZED HEALTH CARE EDUCATION/TRAINING PROGRAM

## 2020-03-09 PROCEDURE — 99396 PREV VISIT EST AGE 40-64: CPT | Mod: 25,S$GLB,, | Performed by: STUDENT IN AN ORGANIZED HEALTH CARE EDUCATION/TRAINING PROGRAM

## 2020-03-09 PROCEDURE — 87491 CHLMYD TRACH DNA AMP PROBE: CPT | Mod: 59

## 2020-03-09 PROCEDURE — 81002 URINALYSIS NONAUTO W/O SCOPE: CPT | Mod: S$GLB,,, | Performed by: STUDENT IN AN ORGANIZED HEALTH CARE EDUCATION/TRAINING PROGRAM

## 2020-03-09 PROCEDURE — 99396 PR PREVENTIVE VISIT,EST,40-64: ICD-10-PCS | Mod: 25,S$GLB,, | Performed by: STUDENT IN AN ORGANIZED HEALTH CARE EDUCATION/TRAINING PROGRAM

## 2020-03-09 PROCEDURE — 99999 PR PBB SHADOW E&M-EST. PATIENT-LVL III: CPT | Mod: PBBFAC,,, | Performed by: STUDENT IN AN ORGANIZED HEALTH CARE EDUCATION/TRAINING PROGRAM

## 2020-03-09 PROCEDURE — 87481 CANDIDA DNA AMP PROBE: CPT | Mod: 59

## 2020-03-09 RX ORDER — METRONIDAZOLE 7.5 MG/G
1 GEL VAGINAL NIGHTLY
Qty: 70 G | Refills: 0 | Status: SHIPPED | OUTPATIENT
Start: 2020-03-09 | End: 2020-03-14

## 2020-03-09 NOTE — PROGRESS NOTES
History & Physical  Gynecology      SUBJECTIVE:     Chief Complaint: Vaginal Discharge       History of Present Illness:    About one week ago had burning after urination. That has since resolved. Now having discomfort on vagina -burning. Hasn't tried OTC. No changes in lifestyle, hygiene. no news meds. No discharge.    Menstrual History: s/p TLH/BS by Dr. Ponce in 2018 for AUB-L   Obstetric Hx:   STD/STI Hx: Denies any history of STD's  Sexually Active: no  Family history: Denies any personal or family history of GYN/colon cancers   Social: Wears seatbelts. Exercises. Feels safe at home. EtOH no. No tobacco or illicit drug use.   Last pap: n/a, never abnormal   Last mammo: 3/2018, normal. Scheduled for later this month  Colonoscopy: with primary care  Vitamins: taking      Review of patient's allergies indicates:   Allergen Reactions    Pcn [penicillins] Anaphylaxis    Phenazopyridine Shortness Of Breath    Amoxicillin Swelling       Past Medical History:   Diagnosis Date    Acid reflux     Anemia      Past Surgical History:   Procedure Laterality Date    COLONOSCOPY      CYSTOSCOPY N/A 2018    Procedure: CYSTOSCOPY;  Surgeon: Talia Ponce MD;  Location: [facility];  Service: OB/GYN;  Laterality: N/A;    ESOPHAGOGASTRODUODENOSCOPY      HYSTERECTOMY  2018    LAPAROSCOPIC SALPINGECTOMY Bilateral 2018    Procedure: SALPINGECTOMY, LAPAROSCOPIC;  Surgeon: Talia Ponce MD;  Location: [facility];  Service: OB/GYN;  Laterality: Bilateral;    LAPAROSCOPIC TOTAL HYSTERECTOMY N/A 2018    Procedure: HYSTERECTOMY-TOTAL LAPAROSCOPIC ([facility]);  Surgeon: Talia Ponce MD;  Location: [facility];  Service: OB/GYN;  Laterality: N/A;     OB History        1    Para   1    Term   1            AB        Living   1       SAB        TAB        Ectopic        Multiple        Live Births   1               Family History   Problem Relation Age of Onset    Breast cancer Neg Hx      Colon cancer Neg Hx     Ovarian cancer Neg Hx      Social History     Tobacco Use    Smoking status: Never Smoker    Smokeless tobacco: Never Used   Substance Use Topics    Alcohol use: No    Drug use: No       Current Outpatient Medications   Medication Sig    albuterol (PROVENTIL/VENTOLIN HFA) 90 mcg/actuation inhaler TK 2 PUFFS INTO THE LUNGS Q 4 H PRN    EPINEPHrine (EPIPEN) 0.3 mg/0.3 mL AtIn Inject 0.3 mg into the muscle.    FERROUS SULFATE (IRON ORAL) Take 1 tablet by mouth 3 (three) times daily.     vitamin E 1000 UNIT capsule Take 1,000 Units by mouth.    metroNIDAZOLE (METROGEL VAGINAL) 0.75 % vaginal gel Place 1 applicator vaginally every evening. for 5 days     No current facility-administered medications for this visit.          Review of Systems:  Review of Systems   Constitutional: Negative for activity change, appetite change, fever and unexpected weight change.   Eyes: Negative for visual disturbance.   Respiratory: Negative for shortness of breath.    Cardiovascular: Negative for chest pain.   Gastrointestinal: Negative for abdominal pain, blood in stool, constipation, diarrhea, nausea and vomiting.   Genitourinary: Positive for dysuria, vaginal discharge and vaginal pain. Negative for dyspareunia, hematuria, pelvic pain, vaginal bleeding, urinary incontinence, postcoital bleeding and vaginal odor.   Integumentary:  Negative for breast mass.   Breast: Negative for lump and mass       OBJECTIVE:     Physical Exam:  Physical Exam   Constitutional: She appears well-developed and well-nourished. No distress.   HENT:   Head: Normocephalic and atraumatic.   Eyes: Conjunctivae are normal.   Neck: Normal range of motion.   Cardiovascular: Normal rate.   Pulmonary/Chest: Effort normal.   Abdominal: Soft. She exhibits no distension and no mass. There is no tenderness. There is no rebound and no guarding.   Genitourinary: There is no rash, tenderness, lesion or injury on the right labia. There is no  rash, tenderness, lesion or injury on the left labia. Cervix exhibits no friability. Right adnexum displays no mass, no tenderness and no fullness. Left adnexum displays no mass, no tenderness and no fullness. No erythema, tenderness or bleeding in the vagina. No foreign body in the vagina. No signs of injury around the vagina. No vaginal discharge found.   Genitourinary Comments: Uterus absent, adnexa not palpated. Cervical cuff intact. Watery discharge present   Musculoskeletal: Normal range of motion.   Neurological: She is alert.   Skin: Skin is warm and dry. She is not diaphoretic.   Psychiatric: She has a normal mood and affect.   Vitals reviewed.        ASSESSMENT:       ICD-10-CM ICD-9-CM    1. Well woman exam with routine gynecological exam Z01.419 V72.31    2. S/P laparoscopic hysterectomy/BS/cysto Z90.710 V88.01    3. Vaginal discomfort N94.9 625.9 Vaginosis Screen by DNA Probe      C. trachomatis/N. gonorrhoeae by AMP DNA      POCT URINE DIPSTICK WITHOUT MICROSCOPE          Plan:      WWE  - Vaccines utd  - Pap n/a  - Mammogram ordered. CBE normal  - Daily vitamin discussed.  - Physical exam normal. VSS.  - RTC for annual or PRN.  - Labs per family medicine doctor    Vaginal discomfort  - Affirm, GC/CT collected  - Will treat for BV per suspicion. Pt prefers topical medications [employer] called in.  - Handout on BV given. Proper hygiene reviewed    Counseling time: 15 minutes   RTC one year for annual or PRN    Chula Box

## 2020-03-09 NOTE — PATIENT INSTRUCTIONS
Bacterial Vaginosis  You have a vaginal infection called bacterial vaginosis (BV). Both good and bad bacteria are present in a healthy vagina. BV occurs when these bacteria get out of balance. The number of bad bacteria increase. And the number of good bacteria decrease.  BV may or may not cause symptoms. If symptoms do occur, they can include:  Thin, gray, milky-white, or sometimes green discharge  Unpleasant odor or fishy smell  Itching, burning, or pain in or around the vagina  It is not known what causes BV, but certain factors can make the problem more likely. This can include:  Can be something a simple as being under the weather/sick/stressed/lack of sleep that stresses the body and immune system leading to this infection  It is also caused at times by over cleaning the vagina. There is good bacteria that lives there. If you clean too much or too directly, you wash away the good bacteria and the irritating things like BV can take over.  Douching  Having sex with a new partner  Having sex with more than one partner  BV will sometimes go away on its own. But treatment is usually recommended. This is because untreated BV can increase the risk of more serious health problems such as:  Pelvic inflammatory disease (PID)   delivery (giving birth to a baby early if youre pregnant)  Other sexually transmitted diseases (STDs)  Infection after surgery on the reproductive organs    Home care  General care  BV is most often treated with medicines called antibiotics. These may be given as pills or as a vaginal cream. If antibiotics are prescribed, be sure to use them exactly as directed. Also, be sure to complete all of the medicine, even if your symptoms go away.  Avoid douching or having sex during treatment.  If you have sex with a female partner, ask your healthcare provider if she should also be treated.  Prevention  Avoid long or frequent baths  Avoid scented soaps/detergents/lotions  Do not douche   If you  do have sex, then take steps to lower your risk:  Use condoms when having sex.  Limit the number of partners you have sex with.    Follow-up care  Follow up with your healthcare provider, or as advised.  When to seek medical advice  Call your healthcare provider right away if:  You have a fever of 100.4ºF (38ºC) or higher, or as directed by your provider.  Your symptoms worsen, or they dont go away within a few days of starting treatment.  You have new pain in the lower belly or pelvic region.  You have side effects that bother you or a reaction to the pills or cream youre prescribed.  You or any partners you have sex with have new symptoms, such as a rash, joint pain, or sores.

## 2020-03-10 LAB
BACTERIAL VAGINOSIS DNA: NEGATIVE
C TRACH DNA SPEC QL NAA+PROBE: NOT DETECTED
CANDIDA GLABRATA DNA: NEGATIVE
CANDIDA KRUSEI DNA: NEGATIVE
CANDIDA RRNA VAG QL PROBE: NEGATIVE
N GONORRHOEA DNA SPEC QL NAA+PROBE: NOT DETECTED
T VAGINALIS RRNA GENITAL QL PROBE: NEGATIVE

## 2021-04-13 ENCOUNTER — OFFICE VISIT (OUTPATIENT)
Dept: OBSTETRICS AND GYNECOLOGY | Facility: CLINIC | Age: 56
End: 2021-04-13
Payer: COMMERCIAL

## 2021-04-13 VITALS
SYSTOLIC BLOOD PRESSURE: 122 MMHG | BODY MASS INDEX: 35.65 KG/M2 | WEIGHT: 221.81 LBS | HEIGHT: 66 IN | DIASTOLIC BLOOD PRESSURE: 78 MMHG

## 2021-04-13 DIAGNOSIS — Z01.419 WELL WOMAN EXAM WITH ROUTINE GYNECOLOGICAL EXAM: ICD-10-CM

## 2021-04-13 DIAGNOSIS — N63.0 BREAST LUMP: ICD-10-CM

## 2021-04-13 DIAGNOSIS — Z90.710 S/P LAPAROSCOPIC HYSTERECTOMY: Primary | ICD-10-CM

## 2021-04-13 DIAGNOSIS — L30.9 ECZEMA, UNSPECIFIED TYPE: ICD-10-CM

## 2021-04-13 PROCEDURE — 1126F PR PAIN SEVERITY QUANTIFIED, NO PAIN PRESENT: ICD-10-PCS | Mod: S$GLB,,, | Performed by: STUDENT IN AN ORGANIZED HEALTH CARE EDUCATION/TRAINING PROGRAM

## 2021-04-13 PROCEDURE — 1126F AMNT PAIN NOTED NONE PRSNT: CPT | Mod: S$GLB,,, | Performed by: STUDENT IN AN ORGANIZED HEALTH CARE EDUCATION/TRAINING PROGRAM

## 2021-04-13 PROCEDURE — 99999 PR PBB SHADOW E&M-EST. PATIENT-LVL III: CPT | Mod: PBBFAC,,, | Performed by: STUDENT IN AN ORGANIZED HEALTH CARE EDUCATION/TRAINING PROGRAM

## 2021-04-13 PROCEDURE — 3008F BODY MASS INDEX DOCD: CPT | Mod: CPTII,S$GLB,, | Performed by: STUDENT IN AN ORGANIZED HEALTH CARE EDUCATION/TRAINING PROGRAM

## 2021-04-13 PROCEDURE — 3008F PR BODY MASS INDEX (BMI) DOCUMENTED: ICD-10-PCS | Mod: CPTII,S$GLB,, | Performed by: STUDENT IN AN ORGANIZED HEALTH CARE EDUCATION/TRAINING PROGRAM

## 2021-04-13 PROCEDURE — 99396 PREV VISIT EST AGE 40-64: CPT | Mod: S$GLB,,, | Performed by: STUDENT IN AN ORGANIZED HEALTH CARE EDUCATION/TRAINING PROGRAM

## 2021-04-13 PROCEDURE — 87481 CANDIDA DNA AMP PROBE: CPT | Mod: 59 | Performed by: STUDENT IN AN ORGANIZED HEALTH CARE EDUCATION/TRAINING PROGRAM

## 2021-04-13 PROCEDURE — 99396 PR PREVENTIVE VISIT,EST,40-64: ICD-10-PCS | Mod: S$GLB,,, | Performed by: STUDENT IN AN ORGANIZED HEALTH CARE EDUCATION/TRAINING PROGRAM

## 2021-04-13 PROCEDURE — 99999 PR PBB SHADOW E&M-EST. PATIENT-LVL III: ICD-10-PCS | Mod: PBBFAC,,, | Performed by: STUDENT IN AN ORGANIZED HEALTH CARE EDUCATION/TRAINING PROGRAM

## 2021-04-13 RX ORDER — FAMOTIDINE 20 MG/1
20 TABLET, FILM COATED ORAL
COMMUNITY
End: 2024-02-26

## 2021-04-14 LAB
BACTERIAL VAGINOSIS DNA: NEGATIVE
CANDIDA GLABRATA DNA: NEGATIVE
CANDIDA KRUSEI DNA: NEGATIVE
CANDIDA RRNA VAG QL PROBE: NEGATIVE
T VAGINALIS RRNA GENITAL QL PROBE: NEGATIVE

## 2021-04-16 ENCOUNTER — PATIENT MESSAGE (OUTPATIENT)
Dept: RESEARCH | Facility: HOSPITAL | Age: 56
End: 2021-04-16

## 2021-05-06 ENCOUNTER — HOSPITAL ENCOUNTER (OUTPATIENT)
Dept: RADIOLOGY | Facility: OTHER | Age: 56
Discharge: HOME | End: 2021-05-06
Attending: STUDENT IN AN ORGANIZED HEALTH CARE EDUCATION/TRAINING PROGRAM
Payer: COMMERCIAL

## 2021-05-06 DIAGNOSIS — Z01.419 WELL WOMAN EXAM WITH ROUTINE GYNECOLOGICAL EXAM: ICD-10-CM

## 2021-05-06 DIAGNOSIS — N63.0 BREAST LUMP: ICD-10-CM

## 2021-05-06 DIAGNOSIS — N63.0 LUMP OR MASS IN BREAST: ICD-10-CM

## 2021-05-06 PROCEDURE — 77062 MAMMO DIGITAL DIAGNOSTIC BILAT WITH TOMO: ICD-10-PCS | Mod: 26,,, | Performed by: RADIOLOGY

## 2021-05-06 PROCEDURE — 77062 BREAST TOMOSYNTHESIS BI: CPT | Mod: TC

## 2021-05-06 PROCEDURE — 77062 BREAST TOMOSYNTHESIS BI: CPT | Mod: 26,,, | Performed by: RADIOLOGY

## 2021-05-06 PROCEDURE — 76642 ULTRASOUND BREAST LIMITED: CPT | Mod: 26,LT,, | Performed by: RADIOLOGY

## 2021-05-06 PROCEDURE — 77066 DX MAMMO INCL CAD BI: CPT | Mod: 26,,, | Performed by: RADIOLOGY

## 2021-05-06 PROCEDURE — 76642 ULTRASOUND BREAST LIMITED: CPT | Mod: TC,LT

## 2021-05-06 PROCEDURE — 76642 US BREAST LEFT LIMITED: ICD-10-PCS | Mod: 26,LT,, | Performed by: RADIOLOGY

## 2021-05-06 PROCEDURE — 77066 MAMMO DIGITAL DIAGNOSTIC BILAT WITH TOMO: ICD-10-PCS | Mod: 26,,, | Performed by: RADIOLOGY

## 2021-05-13 ENCOUNTER — OFFICE VISIT (OUTPATIENT)
Dept: ALLERGY | Facility: CLINIC | Age: 56
End: 2021-05-13
Payer: COMMERCIAL

## 2021-05-13 ENCOUNTER — LAB VISIT (OUTPATIENT)
Dept: LAB | Facility: HOSPITAL | Age: 56
End: 2021-05-13
Attending: STUDENT IN AN ORGANIZED HEALTH CARE EDUCATION/TRAINING PROGRAM
Payer: COMMERCIAL

## 2021-05-13 VITALS — BODY MASS INDEX: 35.68 KG/M2 | WEIGHT: 222 LBS | HEIGHT: 66 IN

## 2021-05-13 DIAGNOSIS — K21.9 GASTROESOPHAGEAL REFLUX DISEASE, UNSPECIFIED WHETHER ESOPHAGITIS PRESENT: ICD-10-CM

## 2021-05-13 DIAGNOSIS — L30.9 ECZEMA, UNSPECIFIED TYPE: Primary | ICD-10-CM

## 2021-05-13 DIAGNOSIS — J45.20 MILD INTERMITTENT ASTHMA, UNSPECIFIED WHETHER COMPLICATED: ICD-10-CM

## 2021-05-13 DIAGNOSIS — L30.9 ECZEMA, UNSPECIFIED TYPE: ICD-10-CM

## 2021-05-13 PROCEDURE — 99999 PR PBB SHADOW E&M-EST. PATIENT-LVL III: CPT | Mod: PBBFAC,,, | Performed by: STUDENT IN AN ORGANIZED HEALTH CARE EDUCATION/TRAINING PROGRAM

## 2021-05-13 PROCEDURE — 3008F BODY MASS INDEX DOCD: CPT | Mod: CPTII,S$GLB,, | Performed by: STUDENT IN AN ORGANIZED HEALTH CARE EDUCATION/TRAINING PROGRAM

## 2021-05-13 PROCEDURE — 3008F PR BODY MASS INDEX (BMI) DOCUMENTED: ICD-10-PCS | Mod: CPTII,S$GLB,, | Performed by: STUDENT IN AN ORGANIZED HEALTH CARE EDUCATION/TRAINING PROGRAM

## 2021-05-13 PROCEDURE — 99204 PR OFFICE/OUTPT VISIT, NEW, LEVL IV, 45-59 MIN: ICD-10-PCS | Mod: S$GLB,,, | Performed by: STUDENT IN AN ORGANIZED HEALTH CARE EDUCATION/TRAINING PROGRAM

## 2021-05-13 PROCEDURE — 99999 PR PBB SHADOW E&M-EST. PATIENT-LVL III: ICD-10-PCS | Mod: PBBFAC,,, | Performed by: STUDENT IN AN ORGANIZED HEALTH CARE EDUCATION/TRAINING PROGRAM

## 2021-05-13 PROCEDURE — 1126F AMNT PAIN NOTED NONE PRSNT: CPT | Mod: S$GLB,,, | Performed by: STUDENT IN AN ORGANIZED HEALTH CARE EDUCATION/TRAINING PROGRAM

## 2021-05-13 PROCEDURE — 82785 ASSAY OF IGE: CPT | Performed by: STUDENT IN AN ORGANIZED HEALTH CARE EDUCATION/TRAINING PROGRAM

## 2021-05-13 PROCEDURE — 86003 ALLG SPEC IGE CRUDE XTRC EA: CPT | Mod: 59 | Performed by: STUDENT IN AN ORGANIZED HEALTH CARE EDUCATION/TRAINING PROGRAM

## 2021-05-13 PROCEDURE — 1126F PR PAIN SEVERITY QUANTIFIED, NO PAIN PRESENT: ICD-10-PCS | Mod: S$GLB,,, | Performed by: STUDENT IN AN ORGANIZED HEALTH CARE EDUCATION/TRAINING PROGRAM

## 2021-05-13 PROCEDURE — 99204 OFFICE O/P NEW MOD 45 MIN: CPT | Mod: S$GLB,,, | Performed by: STUDENT IN AN ORGANIZED HEALTH CARE EDUCATION/TRAINING PROGRAM

## 2021-05-13 PROCEDURE — 36415 COLL VENOUS BLD VENIPUNCTURE: CPT | Performed by: STUDENT IN AN ORGANIZED HEALTH CARE EDUCATION/TRAINING PROGRAM

## 2021-05-13 PROCEDURE — 86003 ALLG SPEC IGE CRUDE XTRC EA: CPT | Performed by: STUDENT IN AN ORGANIZED HEALTH CARE EDUCATION/TRAINING PROGRAM

## 2021-05-13 RX ORDER — HYDROCORTISONE 25 MG/G
CREAM TOPICAL
Qty: 453.6 G | Refills: 1 | Status: SHIPPED | OUTPATIENT
Start: 2021-05-13 | End: 2021-10-01

## 2021-05-14 LAB — IGE SERPL-ACNC: <35 IU/ML (ref 0–100)

## 2021-05-17 LAB
A ALTERNATA IGE QN: 1.04 KU/L
A FUMIGATUS IGE QN: 0.4 KU/L
BAHIA GRASS IGE QN: <0.1 KU/L
BERMUDA GRASS IGE QN: <0.1 KU/L
CAT DANDER IGE QN: <0.1 KU/L
CEDAR IGE QN: <0.1 KU/L
CHICKPEA IGE AB [UNITS/VOLUME] IN SERUM: <0.1 KU/L
CORN IGE QN: <0.1 KU/L
D FARINAE IGE QN: 1.48 KU/L
D PTERONYSS IGE QN: 0.84 KU/L
DEPRECATED A ALTERNATA IGE RAST QL: ABNORMAL
DEPRECATED A FUMIGATUS IGE RAST QL: ABNORMAL
DEPRECATED BAHIA GRASS IGE RAST QL: NORMAL
DEPRECATED BERMUDA GRASS IGE RAST QL: NORMAL
DEPRECATED CAT DANDER IGE RAST QL: NORMAL
DEPRECATED CEDAR IGE RAST QL: NORMAL
DEPRECATED CHICK PEA IGE RAST QL: NORMAL
DEPRECATED CORN IGE RAST QL: NORMAL
DEPRECATED D FARINAE IGE RAST QL: ABNORMAL
DEPRECATED D PTERONYSS IGE RAST QL: ABNORMAL
DEPRECATED DOG DANDER IGE RAST QL: NORMAL
DEPRECATED ENGL PLANTAIN IGE RAST QL: NORMAL
DEPRECATED JOHNSON GRASS IGE RAST QL: NORMAL
DEPRECATED PECAN/HICK TREE IGE RAST QL: NORMAL
DEPRECATED ROACH IGE RAST QL: ABNORMAL
DEPRECATED SESAME SEED IGE RAST QL: NORMAL
DEPRECATED SOYBEAN IGE RAST QL: NORMAL
DEPRECATED TIMOTHY IGE RAST QL: NORMAL
DEPRECATED WEST RAGWEED IGE RAST QL: ABNORMAL
DEPRECATED WHEAT IGE RAST QL: NORMAL
DEPRECATED WHITE OAK IGE RAST QL: NORMAL
DOG DANDER IGE QN: <0.1 KU/L
ENGL PLANTAIN IGE QN: <0.1 KU/L
JOHNSON GRASS IGE QN: <0.1 KU/L
PECAN/HICK TREE IGE QN: <0.1 KU/L
ROACH IGE QN: 0.14 KU/L
SESAME SEED IGE QN: <0.1 KU/L
SOYBEAN IGE QN: <0.1 KU/L
TIMOTHY IGE QN: <0.1 KU/L
WEST RAGWEED IGE QN: 0.17 KU/L
WHEAT IGE QN: <0.1 KU/L
WHITE OAK IGE QN: <0.1 KU/L

## 2021-05-20 ENCOUNTER — PATIENT MESSAGE (OUTPATIENT)
Dept: ALLERGY | Facility: CLINIC | Age: 56
End: 2021-05-20

## 2021-08-10 ENCOUNTER — OFFICE VISIT (OUTPATIENT)
Dept: OBSTETRICS AND GYNECOLOGY | Facility: CLINIC | Age: 56
End: 2021-08-10
Payer: COMMERCIAL

## 2021-08-10 VITALS — BODY MASS INDEX: 35.72 KG/M2 | WEIGHT: 222.25 LBS | HEIGHT: 66 IN

## 2021-08-10 DIAGNOSIS — N93.9 VAGINA BLEEDING: Primary | ICD-10-CM

## 2021-08-10 LAB
C TRACH DNA SPEC QL NAA+PROBE: NOT DETECTED
N GONORRHOEA DNA SPEC QL NAA+PROBE: NOT DETECTED

## 2021-08-10 PROCEDURE — 1126F PR PAIN SEVERITY QUANTIFIED, NO PAIN PRESENT: ICD-10-PCS | Mod: CPTII,S$GLB,, | Performed by: STUDENT IN AN ORGANIZED HEALTH CARE EDUCATION/TRAINING PROGRAM

## 2021-08-10 PROCEDURE — 87491 CHLMYD TRACH DNA AMP PROBE: CPT | Performed by: STUDENT IN AN ORGANIZED HEALTH CARE EDUCATION/TRAINING PROGRAM

## 2021-08-10 PROCEDURE — 87591 N.GONORRHOEAE DNA AMP PROB: CPT | Mod: 59 | Performed by: STUDENT IN AN ORGANIZED HEALTH CARE EDUCATION/TRAINING PROGRAM

## 2021-08-10 PROCEDURE — 1126F AMNT PAIN NOTED NONE PRSNT: CPT | Mod: CPTII,S$GLB,, | Performed by: STUDENT IN AN ORGANIZED HEALTH CARE EDUCATION/TRAINING PROGRAM

## 2021-08-10 PROCEDURE — 3008F BODY MASS INDEX DOCD: CPT | Mod: CPTII,S$GLB,, | Performed by: STUDENT IN AN ORGANIZED HEALTH CARE EDUCATION/TRAINING PROGRAM

## 2021-08-10 PROCEDURE — 3008F PR BODY MASS INDEX (BMI) DOCUMENTED: ICD-10-PCS | Mod: CPTII,S$GLB,, | Performed by: STUDENT IN AN ORGANIZED HEALTH CARE EDUCATION/TRAINING PROGRAM

## 2021-08-10 PROCEDURE — 99999 PR PBB SHADOW E&M-EST. PATIENT-LVL II: ICD-10-PCS | Mod: PBBFAC,,, | Performed by: STUDENT IN AN ORGANIZED HEALTH CARE EDUCATION/TRAINING PROGRAM

## 2021-08-10 PROCEDURE — 99212 PR OFFICE/OUTPT VISIT, EST, LEVL II, 10-19 MIN: ICD-10-PCS | Mod: S$GLB,,, | Performed by: STUDENT IN AN ORGANIZED HEALTH CARE EDUCATION/TRAINING PROGRAM

## 2021-08-10 PROCEDURE — 1159F PR MEDICATION LIST DOCUMENTED IN MEDICAL RECORD: ICD-10-PCS | Mod: CPTII,S$GLB,, | Performed by: STUDENT IN AN ORGANIZED HEALTH CARE EDUCATION/TRAINING PROGRAM

## 2021-08-10 PROCEDURE — 99999 PR PBB SHADOW E&M-EST. PATIENT-LVL II: CPT | Mod: PBBFAC,,, | Performed by: STUDENT IN AN ORGANIZED HEALTH CARE EDUCATION/TRAINING PROGRAM

## 2021-08-10 PROCEDURE — 87481 CANDIDA DNA AMP PROBE: CPT | Mod: 59 | Performed by: STUDENT IN AN ORGANIZED HEALTH CARE EDUCATION/TRAINING PROGRAM

## 2021-08-10 PROCEDURE — 99212 OFFICE O/P EST SF 10 MIN: CPT | Mod: S$GLB,,, | Performed by: STUDENT IN AN ORGANIZED HEALTH CARE EDUCATION/TRAINING PROGRAM

## 2021-08-10 PROCEDURE — 1159F MED LIST DOCD IN RCRD: CPT | Mod: CPTII,S$GLB,, | Performed by: STUDENT IN AN ORGANIZED HEALTH CARE EDUCATION/TRAINING PROGRAM

## 2021-08-10 RX ORDER — METRONIDAZOLE 7.5 MG/G
1 GEL VAGINAL NIGHTLY
COMMUNITY
Start: 2021-02-22 | End: 2021-10-18

## 2021-08-10 RX ORDER — HYDROCHLOROTHIAZIDE 12.5 MG/1
12.5 TABLET ORAL EVERY MORNING
COMMUNITY
Start: 2021-05-17 | End: 2024-02-26

## 2021-09-29 ENCOUNTER — PATIENT MESSAGE (OUTPATIENT)
Dept: OBSTETRICS AND GYNECOLOGY | Facility: CLINIC | Age: 56
End: 2021-09-29

## 2021-09-29 RX ORDER — TERCONAZOLE 4 MG/G
1 CREAM VAGINAL NIGHTLY
Qty: 45 G | Refills: 0 | Status: SHIPPED | OUTPATIENT
Start: 2021-09-29 | End: 2021-10-06

## 2021-09-30 ENCOUNTER — PATIENT MESSAGE (OUTPATIENT)
Dept: OBSTETRICS AND GYNECOLOGY | Facility: CLINIC | Age: 56
End: 2021-09-30

## 2021-10-01 ENCOUNTER — PATIENT MESSAGE (OUTPATIENT)
Dept: OBSTETRICS AND GYNECOLOGY | Facility: CLINIC | Age: 56
End: 2021-10-01

## 2021-10-01 ENCOUNTER — OFFICE VISIT (OUTPATIENT)
Dept: UROLOGY | Facility: CLINIC | Age: 56
End: 2021-10-01
Payer: COMMERCIAL

## 2021-10-01 VITALS — BODY MASS INDEX: 36.02 KG/M2 | HEIGHT: 66 IN | WEIGHT: 224.13 LBS

## 2021-10-01 DIAGNOSIS — N39.3 STRESS INCONTINENCE: ICD-10-CM

## 2021-10-01 DIAGNOSIS — R82.90 MALODOROUS URINE: Primary | ICD-10-CM

## 2021-10-01 DIAGNOSIS — R35.0 URINARY FREQUENCY: ICD-10-CM

## 2021-10-01 LAB
BILIRUB SERPL-MCNC: NORMAL MG/DL
BLOOD URINE, POC: NORMAL
CLARITY, POC UA: CLEAR
COLOR, POC UA: YELLOW
GLUCOSE UR QL STRIP: NORMAL
KETONES UR QL STRIP: NORMAL
LEUKOCYTE ESTERASE URINE, POC: NORMAL
NITRITE, POC UA: NORMAL
PH, POC UA: 5
PROTEIN, POC: NORMAL
SPECIFIC GRAVITY, POC UA: 1020
UROBILINOGEN, POC UA: NORMAL

## 2021-10-01 PROCEDURE — 1160F PR REVIEW ALL MEDS BY PRESCRIBER/CLIN PHARMACIST DOCUMENTED: ICD-10-PCS | Mod: CPTII,S$GLB,, | Performed by: STUDENT IN AN ORGANIZED HEALTH CARE EDUCATION/TRAINING PROGRAM

## 2021-10-01 PROCEDURE — 3008F BODY MASS INDEX DOCD: CPT | Mod: CPTII,S$GLB,, | Performed by: STUDENT IN AN ORGANIZED HEALTH CARE EDUCATION/TRAINING PROGRAM

## 2021-10-01 PROCEDURE — 87798 DETECT AGENT NOS DNA AMP: CPT | Mod: 59 | Performed by: STUDENT IN AN ORGANIZED HEALTH CARE EDUCATION/TRAINING PROGRAM

## 2021-10-01 PROCEDURE — 87086 URINE CULTURE/COLONY COUNT: CPT | Performed by: STUDENT IN AN ORGANIZED HEALTH CARE EDUCATION/TRAINING PROGRAM

## 2021-10-01 PROCEDURE — 99203 OFFICE O/P NEW LOW 30 MIN: CPT | Mod: S$GLB,,, | Performed by: STUDENT IN AN ORGANIZED HEALTH CARE EDUCATION/TRAINING PROGRAM

## 2021-10-01 PROCEDURE — 99203 PR OFFICE/OUTPT VISIT, NEW, LEVL III, 30-44 MIN: ICD-10-PCS | Mod: S$GLB,,, | Performed by: STUDENT IN AN ORGANIZED HEALTH CARE EDUCATION/TRAINING PROGRAM

## 2021-10-01 PROCEDURE — 81002 URINALYSIS NONAUTO W/O SCOPE: CPT | Mod: S$GLB,,, | Performed by: STUDENT IN AN ORGANIZED HEALTH CARE EDUCATION/TRAINING PROGRAM

## 2021-10-01 PROCEDURE — 99999 PR PBB SHADOW E&M-EST. PATIENT-LVL III: CPT | Mod: PBBFAC,,, | Performed by: STUDENT IN AN ORGANIZED HEALTH CARE EDUCATION/TRAINING PROGRAM

## 2021-10-01 PROCEDURE — 1159F PR MEDICATION LIST DOCUMENTED IN MEDICAL RECORD: ICD-10-PCS | Mod: CPTII,S$GLB,, | Performed by: STUDENT IN AN ORGANIZED HEALTH CARE EDUCATION/TRAINING PROGRAM

## 2021-10-01 PROCEDURE — 1159F MED LIST DOCD IN RCRD: CPT | Mod: CPTII,S$GLB,, | Performed by: STUDENT IN AN ORGANIZED HEALTH CARE EDUCATION/TRAINING PROGRAM

## 2021-10-01 PROCEDURE — 99999 PR PBB SHADOW E&M-EST. PATIENT-LVL III: ICD-10-PCS | Mod: PBBFAC,,, | Performed by: STUDENT IN AN ORGANIZED HEALTH CARE EDUCATION/TRAINING PROGRAM

## 2021-10-01 PROCEDURE — 87798 DETECT AGENT NOS DNA AMP: CPT | Performed by: STUDENT IN AN ORGANIZED HEALTH CARE EDUCATION/TRAINING PROGRAM

## 2021-10-01 PROCEDURE — 1160F RVW MEDS BY RX/DR IN RCRD: CPT | Mod: CPTII,S$GLB,, | Performed by: STUDENT IN AN ORGANIZED HEALTH CARE EDUCATION/TRAINING PROGRAM

## 2021-10-01 PROCEDURE — 3008F PR BODY MASS INDEX (BMI) DOCUMENTED: ICD-10-PCS | Mod: CPTII,S$GLB,, | Performed by: STUDENT IN AN ORGANIZED HEALTH CARE EDUCATION/TRAINING PROGRAM

## 2021-10-01 PROCEDURE — 81002 POCT URINE DIPSTICK WITHOUT MICROSCOPE: ICD-10-PCS | Mod: S$GLB,,, | Performed by: STUDENT IN AN ORGANIZED HEALTH CARE EDUCATION/TRAINING PROGRAM

## 2021-10-03 ENCOUNTER — PATIENT MESSAGE (OUTPATIENT)
Dept: UROLOGY | Facility: CLINIC | Age: 56
End: 2021-10-03

## 2021-10-03 LAB — BACTERIA UR CULT: NORMAL

## 2021-10-07 LAB
MYCOPLASMA GENITALIUM RESULT: NEGATIVE
SPECIMEN SOURCE: NORMAL
SPECIMEN SOURCE: NORMAL
U PARVUM DNA SPEC QL NAA+PROBE: NEGATIVE
U UREALYTICUM DNA SPEC QL NAA+PROBE: NEGATIVE

## 2021-10-14 ENCOUNTER — PATIENT MESSAGE (OUTPATIENT)
Dept: OBSTETRICS AND GYNECOLOGY | Facility: CLINIC | Age: 56
End: 2021-10-14
Payer: COMMERCIAL

## 2021-10-15 RX ORDER — METRONIDAZOLE 7.5 MG/G
1 GEL VAGINAL DAILY
Qty: 70 G | Refills: 0 | Status: SHIPPED | OUTPATIENT
Start: 2021-10-15 | End: 2024-02-26

## 2021-10-18 ENCOUNTER — PATIENT MESSAGE (OUTPATIENT)
Dept: OBSTETRICS AND GYNECOLOGY | Facility: CLINIC | Age: 56
End: 2021-10-18

## 2021-10-18 DIAGNOSIS — N94.89 ADNEXAL MASS: Primary | ICD-10-CM

## 2021-10-20 ENCOUNTER — PATIENT MESSAGE (OUTPATIENT)
Dept: OBSTETRICS AND GYNECOLOGY | Facility: CLINIC | Age: 56
End: 2021-10-20
Payer: COMMERCIAL

## 2021-10-21 ENCOUNTER — PATIENT MESSAGE (OUTPATIENT)
Dept: OBSTETRICS AND GYNECOLOGY | Facility: CLINIC | Age: 56
End: 2021-10-21

## 2021-10-22 ENCOUNTER — OFFICE VISIT (OUTPATIENT)
Dept: OBSTETRICS AND GYNECOLOGY | Facility: CLINIC | Age: 56
End: 2021-10-22
Payer: COMMERCIAL

## 2021-10-22 VITALS
HEIGHT: 66 IN | SYSTOLIC BLOOD PRESSURE: 120 MMHG | DIASTOLIC BLOOD PRESSURE: 78 MMHG | BODY MASS INDEX: 34.86 KG/M2 | WEIGHT: 216.94 LBS

## 2021-10-22 DIAGNOSIS — N76.0 VAGINITIS AND VULVOVAGINITIS: Primary | ICD-10-CM

## 2021-10-22 PROCEDURE — 87591 N.GONORRHOEAE DNA AMP PROB: CPT | Performed by: STUDENT IN AN ORGANIZED HEALTH CARE EDUCATION/TRAINING PROGRAM

## 2021-10-22 PROCEDURE — 1159F MED LIST DOCD IN RCRD: CPT | Mod: CPTII,S$GLB,, | Performed by: STUDENT IN AN ORGANIZED HEALTH CARE EDUCATION/TRAINING PROGRAM

## 2021-10-22 PROCEDURE — 1159F PR MEDICATION LIST DOCUMENTED IN MEDICAL RECORD: ICD-10-PCS | Mod: CPTII,S$GLB,, | Performed by: STUDENT IN AN ORGANIZED HEALTH CARE EDUCATION/TRAINING PROGRAM

## 2021-10-22 PROCEDURE — 3078F PR MOST RECENT DIASTOLIC BLOOD PRESSURE < 80 MM HG: ICD-10-PCS | Mod: CPTII,S$GLB,, | Performed by: STUDENT IN AN ORGANIZED HEALTH CARE EDUCATION/TRAINING PROGRAM

## 2021-10-22 PROCEDURE — 99213 OFFICE O/P EST LOW 20 MIN: CPT | Mod: S$GLB,,, | Performed by: STUDENT IN AN ORGANIZED HEALTH CARE EDUCATION/TRAINING PROGRAM

## 2021-10-22 PROCEDURE — 3078F DIAST BP <80 MM HG: CPT | Mod: CPTII,S$GLB,, | Performed by: STUDENT IN AN ORGANIZED HEALTH CARE EDUCATION/TRAINING PROGRAM

## 2021-10-22 PROCEDURE — 99213 PR OFFICE/OUTPT VISIT, EST, LEVL III, 20-29 MIN: ICD-10-PCS | Mod: S$GLB,,, | Performed by: STUDENT IN AN ORGANIZED HEALTH CARE EDUCATION/TRAINING PROGRAM

## 2021-10-22 PROCEDURE — 3008F PR BODY MASS INDEX (BMI) DOCUMENTED: ICD-10-PCS | Mod: CPTII,S$GLB,, | Performed by: STUDENT IN AN ORGANIZED HEALTH CARE EDUCATION/TRAINING PROGRAM

## 2021-10-22 PROCEDURE — 3074F SYST BP LT 130 MM HG: CPT | Mod: CPTII,S$GLB,, | Performed by: STUDENT IN AN ORGANIZED HEALTH CARE EDUCATION/TRAINING PROGRAM

## 2021-10-22 PROCEDURE — 87491 CHLMYD TRACH DNA AMP PROBE: CPT | Mod: 59 | Performed by: STUDENT IN AN ORGANIZED HEALTH CARE EDUCATION/TRAINING PROGRAM

## 2021-10-22 PROCEDURE — 3074F PR MOST RECENT SYSTOLIC BLOOD PRESSURE < 130 MM HG: ICD-10-PCS | Mod: CPTII,S$GLB,, | Performed by: STUDENT IN AN ORGANIZED HEALTH CARE EDUCATION/TRAINING PROGRAM

## 2021-10-22 PROCEDURE — 3008F BODY MASS INDEX DOCD: CPT | Mod: CPTII,S$GLB,, | Performed by: STUDENT IN AN ORGANIZED HEALTH CARE EDUCATION/TRAINING PROGRAM

## 2021-10-22 PROCEDURE — 87481 CANDIDA DNA AMP PROBE: CPT | Mod: 59 | Performed by: STUDENT IN AN ORGANIZED HEALTH CARE EDUCATION/TRAINING PROGRAM

## 2021-10-22 RX ORDER — METRONIDAZOLE 7.5 MG/G
1 GEL VAGINAL DAILY
Qty: 5 G | Refills: 0 | Status: SHIPPED | OUTPATIENT
Start: 2021-10-22 | End: 2021-10-27

## 2021-10-23 ENCOUNTER — PATIENT MESSAGE (OUTPATIENT)
Dept: OBSTETRICS AND GYNECOLOGY | Facility: CLINIC | Age: 56
End: 2021-10-23
Payer: COMMERCIAL

## 2021-10-25 ENCOUNTER — PATIENT MESSAGE (OUTPATIENT)
Dept: OBSTETRICS AND GYNECOLOGY | Facility: CLINIC | Age: 56
End: 2021-10-25
Payer: COMMERCIAL

## 2021-10-26 ENCOUNTER — HOSPITAL ENCOUNTER (OUTPATIENT)
Dept: RADIOLOGY | Facility: OTHER | Age: 56
Discharge: HOME | End: 2021-10-26
Attending: STUDENT IN AN ORGANIZED HEALTH CARE EDUCATION/TRAINING PROGRAM
Payer: COMMERCIAL

## 2021-10-26 DIAGNOSIS — N94.89 ADNEXAL MASS: ICD-10-CM

## 2021-10-26 PROCEDURE — 76856 US EXAM PELVIC COMPLETE: CPT | Mod: TC

## 2021-10-26 PROCEDURE — 76830 TRANSVAGINAL US NON-OB: CPT | Mod: 26,,, | Performed by: RADIOLOGY

## 2021-10-26 PROCEDURE — 76856 US PELVIS COMP WITH TRANSVAG NON-OB (XPD): ICD-10-PCS | Mod: 26,,, | Performed by: RADIOLOGY

## 2021-10-26 PROCEDURE — 76856 US EXAM PELVIC COMPLETE: CPT | Mod: 26,,, | Performed by: RADIOLOGY

## 2021-10-26 PROCEDURE — 76830 US PELVIS COMP WITH TRANSVAG NON-OB (XPD): ICD-10-PCS | Mod: 26,,, | Performed by: RADIOLOGY

## 2021-11-02 ENCOUNTER — CLINICAL SUPPORT (OUTPATIENT)
Dept: REHABILITATION | Facility: HOSPITAL | Age: 56
End: 2021-11-02
Attending: STUDENT IN AN ORGANIZED HEALTH CARE EDUCATION/TRAINING PROGRAM
Payer: COMMERCIAL

## 2021-11-02 DIAGNOSIS — N39.3 STRESS INCONTINENCE: ICD-10-CM

## 2021-11-02 DIAGNOSIS — M62.89 PELVIC FLOOR DYSFUNCTION: ICD-10-CM

## 2021-11-02 DIAGNOSIS — R27.8 COORDINATION IMPAIRMENT: ICD-10-CM

## 2021-11-02 PROCEDURE — 97161 PT EVAL LOW COMPLEX 20 MIN: CPT | Mod: PN

## 2021-11-02 PROCEDURE — 97530 THERAPEUTIC ACTIVITIES: CPT | Mod: PN

## 2021-11-02 PROCEDURE — 97112 NEUROMUSCULAR REEDUCATION: CPT | Mod: PN

## 2021-11-05 PROBLEM — M62.89 PELVIC FLOOR DYSFUNCTION: Status: ACTIVE | Noted: 2021-11-05

## 2021-11-05 PROBLEM — R27.8 COORDINATION IMPAIRMENT: Status: ACTIVE | Noted: 2021-11-05

## 2021-11-23 ENCOUNTER — CLINICAL SUPPORT (OUTPATIENT)
Dept: REHABILITATION | Facility: HOSPITAL | Age: 56
End: 2021-11-23
Attending: STUDENT IN AN ORGANIZED HEALTH CARE EDUCATION/TRAINING PROGRAM
Payer: COMMERCIAL

## 2021-11-23 DIAGNOSIS — R27.8 COORDINATION IMPAIRMENT: ICD-10-CM

## 2021-11-23 DIAGNOSIS — M62.89 PELVIC FLOOR DYSFUNCTION: ICD-10-CM

## 2021-11-23 PROCEDURE — 97112 NEUROMUSCULAR REEDUCATION: CPT | Mod: PN

## 2021-11-23 PROCEDURE — 97530 THERAPEUTIC ACTIVITIES: CPT | Mod: PN

## 2021-11-23 PROCEDURE — 97110 THERAPEUTIC EXERCISES: CPT | Mod: PN

## 2021-12-07 ENCOUNTER — CLINICAL SUPPORT (OUTPATIENT)
Dept: REHABILITATION | Facility: HOSPITAL | Age: 56
End: 2021-12-07
Attending: STUDENT IN AN ORGANIZED HEALTH CARE EDUCATION/TRAINING PROGRAM
Payer: COMMERCIAL

## 2021-12-07 DIAGNOSIS — R27.8 COORDINATION IMPAIRMENT: ICD-10-CM

## 2021-12-07 DIAGNOSIS — M62.89 PELVIC FLOOR DYSFUNCTION: ICD-10-CM

## 2021-12-07 PROCEDURE — 97110 THERAPEUTIC EXERCISES: CPT | Mod: PN

## 2021-12-07 PROCEDURE — 97112 NEUROMUSCULAR REEDUCATION: CPT | Mod: PN

## 2021-12-28 ENCOUNTER — CLINICAL SUPPORT (OUTPATIENT)
Dept: REHABILITATION | Facility: HOSPITAL | Age: 56
End: 2021-12-28
Attending: STUDENT IN AN ORGANIZED HEALTH CARE EDUCATION/TRAINING PROGRAM
Payer: COMMERCIAL

## 2021-12-28 DIAGNOSIS — M62.89 PELVIC FLOOR DYSFUNCTION: ICD-10-CM

## 2021-12-28 DIAGNOSIS — R27.8 COORDINATION IMPAIRMENT: ICD-10-CM

## 2021-12-28 PROCEDURE — 97110 THERAPEUTIC EXERCISES: CPT | Mod: PN

## 2021-12-28 PROCEDURE — 97112 NEUROMUSCULAR REEDUCATION: CPT | Mod: PN

## 2022-01-20 ENCOUNTER — CLINICAL SUPPORT (OUTPATIENT)
Dept: REHABILITATION | Facility: HOSPITAL | Age: 57
End: 2022-01-20
Attending: STUDENT IN AN ORGANIZED HEALTH CARE EDUCATION/TRAINING PROGRAM
Payer: COMMERCIAL

## 2022-01-20 DIAGNOSIS — M62.89 PELVIC FLOOR DYSFUNCTION: ICD-10-CM

## 2022-01-20 DIAGNOSIS — R27.8 COORDINATION IMPAIRMENT: ICD-10-CM

## 2022-01-20 PROCEDURE — 97112 NEUROMUSCULAR REEDUCATION: CPT | Mod: PN

## 2022-01-20 PROCEDURE — 97110 THERAPEUTIC EXERCISES: CPT | Mod: PN

## 2022-01-20 NOTE — PROGRESS NOTES
"  Pelvic Health Physical Therapy   Treatment Note     Name: Karli Jerez  Clinic Number: [id number]    Therapy Diagnosis:   Encounter Diagnoses   Name Primary?    Pelvic floor dysfunction     Coordination impairment      Physician: Tyler Knox MD    Visit Date: 1/20/2022    Physician Orders: PT Eval and Treat - Pelvic Floor PT   Medical Diagnosis from Referral: N39.3 (ICD-10-CM) - Stress incontinence  Evaluation Date: 11/2/2021  Authorization Period Expiration: 12/31/2021  Plan of Care Expiration: 2/2/2022  Visit # / Visits authorized: 1/ 20    Cancelled Visits: 0  No Show Visits: 0    Time In: 1700   Time Out: 1755  Total Billable Time: 55 minutes    Precautions: Standard    Subjective     Pt reports:  Frequency has been pretty consistent at 1:50; almost to 2 hours. Started pilates with reformer; goes 2x/week. Has not has any issues with leakage.  Work has been going well; has not had to change clothes this month.    She was fairly compliant with home exercise program.  Response to previous treatment: no adverse effects   Functional change: ongoing     Pain: n/a    Objective     Karli received therapeutic exercises to develop  strength and endurance for 45 minutes including: Kegels Endurance Holds and Kegels: Quick flicks      Nu-step x4'     kegels -  bent over at EOM c/ hip IR 2x8      sitting 2x5, x8    staggered standing x5 ea   Standing 2x5, x8   + step ups 2x5 ea    + sit <> stands 2x5    + bridge 2x10 - Heels up vs toes up    + staggered HS bridge on toes x5 ea    + SL bosu bridge holds 2x5 ea     Squat taps 2x15  Hip Add Squeeze 2x15x5"  Crunches x10, + Rotation x5 ea   Scissor kicks x5 ea   plank at EOM 3x15"  side plank at EOM 2x15"   Step ups 2x8 ea   + Bridges 15# x15    + SL leg lift 15#    + HS bias on toes x10     + staggered stance x5 ea   + Shuttle kickbacks 2x10 ea   + Pistol squat 2x8 ea  + SL Bosu bridge 2x5 ea   + SL Bridge holds c/ hip ext isometrics 5x3 ea     Karli received " "the following manual therapy techniques: to develop flexibility for 00 minutes including:    Karli participated in neuromuscular re-education activities to develop Coordination and Control for 10 minutes including: diaphragmatic breathing with Kegel and DB + Kegel + activity    DB in supine x1'   bent over at EOM c/ hip IR 2x30"  DB+ kegels - bent over at EOM c/ hip IR, sitting, staggered standing, standing     Karli participated in dynamic functional therapeutic activities to improve functional performance for 5 minutes, including: Education as described below     Bladder retraining review - progress to 2:00 - 2:15 this month      Home Exercises Provided and Patient Education Provided     Education provided:   - anatomy/physiology of pelvic floor, posture/body mechanices, bladder retraining, diaphragmatic breathing, kegels, behavior modifications and Coordination of kegels with functional activities such as cough, laugh, sneeze, lift, etc.   Discussed progression of plan of care with patient; educated pt in activity modification; reviewed HEP with pt. Pt demonstrated and verbalized understanding of all instruction and was provided with a handout of HEP (see Patient Instructions).    Written Home Exercises Provided: yes.  Exercises were reviewed and Karli was able to demonstrate them prior to the end of the session.  Karli demonstrated good  understanding of the education provided.     See EMR under Patient Instructions for exercises provided 11/23/2021.    Assessment     Karli continues to progress well. Because she reported slight insecurity with continence during single leg bridge variation in pilates class, today's session dedicated to improving PFM recruitment with variations of plantarflexion, knee flexion, hip extension, and posterior pelvic tilting. All performed without feelings of insecurity. Based on performance, Karli is appropriate for decreased frequency of attendance and transition to " independent management of symptoms. Plan to continue with bladder retraining and reassess next month.     Karli is progressing well towards her goals.   Pt prognosis is Excellent.     Pt will continue to benefit from skilled outpatient physical therapy to address the deficits listed in the problem list box on initial evaluation, provide pt/family education and to maximize pt's level of independence in the home and community environment.     Pt's spiritual, cultural and educational needs considered and pt agreeable to plan of care and goals.     Anticipated barriers to physical therapy: None     Goals:   Short Term Goals: 5 weeks   Pt to be edu pelvic muscle bracing and be able to consistently perform correctly and quickly to help decrease incontinence with cough/laugh/sneeze. -MET 12/28/2021  Pt to be able to perform a 2 second kegel x 10 reps to demonstrate improving strength and endurance needed for continence.-progressing  Pt to demonstrate being able to correctly and consistently perform a kegel which is needed  to increase pelvic floor muscle coordination and strength needed for continence.  -MET 12/28/2021  Pt to voice understanding of the role that diet plays on urinary urgency.   -MET 12/28/2021  Pt to report a decrease in nocturia to no more than 1x/night for improved ability to achieve restorative sleep.  -MET 12/28/2021  Pt to be compliant with a voiding schedule of every 2 hours to help decrease urgency-progressing  Pt to demonstrate an improved score in the FOTO urinary problem  survey  to at least 42% limitation to demonstrate improving activity participation.    -progressing  Pt to demonstrate proper positioning on commode with breathing techniques to decrease strain with BM to enable pt to feel empty after BM.  -MET 12/28/2021     Long Term Goals: 10 weeks   Pt to be discharged with home plan for carry over after discharge.  -progressing  Pt to be able to perform a 3 second kegel x 10 reps to  demonstrate improving strength and endurance needed for continence.-progressing  Pt to no longer need to wear a pad for protection due to reduction of urinary incontinence by at least 90% with ADLs. -MET 12/28/2021  Pt to report no longer feeling the need to urinate just in case when shopping or participating in social activities to demonstrate improving pelvic floor and bladder control.-progressing  Pt to report a decrease in urinary frequency from every 2 hours to no more than once every 3-4 hours hour(s) to improve ability to participate in social activities.-progressing  Pt to increase pelvic floor strength to at least 3/5 to demonstrate improved strength needed for continence with ADLs. -progressing  Pt to report a decrease in nocturia to no more than 0-1x/night for improved restorative sleep.   -MET 12/28/2021  Pt to demonstrate an improved score in the FOTO urinary problems survey  to at least 35% limitation  to demonstrate improving activity participation.   -progressing  Pt to report being able to have a spontaneous bowel movement at least once every 1-2 days to demonstrate improving gut motility and pelvic floor coordination. -MET 12/28/2021    Plan     Plan for next visit: monitor voiding schedule    CARSON MCLAUGHLIN, PT   01/20/2022

## 2022-01-21 ENCOUNTER — PATIENT MESSAGE (OUTPATIENT)
Dept: OBSTETRICS AND GYNECOLOGY | Facility: CLINIC | Age: 57
End: 2022-01-21
Payer: COMMERCIAL

## 2022-01-24 ENCOUNTER — OFFICE VISIT (OUTPATIENT)
Dept: OBSTETRICS AND GYNECOLOGY | Facility: CLINIC | Age: 57
End: 2022-01-24
Payer: COMMERCIAL

## 2022-01-24 VITALS
DIASTOLIC BLOOD PRESSURE: 78 MMHG | WEIGHT: 218.25 LBS | BODY MASS INDEX: 35.08 KG/M2 | HEIGHT: 66 IN | SYSTOLIC BLOOD PRESSURE: 120 MMHG

## 2022-01-24 DIAGNOSIS — N76.0 ACUTE VAGINITIS: Primary | ICD-10-CM

## 2022-01-24 DIAGNOSIS — F43.21 FEELING GRIEF: ICD-10-CM

## 2022-01-24 PROCEDURE — 87491 CHLMYD TRACH DNA AMP PROBE: CPT | Performed by: NURSE PRACTITIONER

## 2022-01-24 PROCEDURE — 3008F BODY MASS INDEX DOCD: CPT | Mod: CPTII,S$GLB,, | Performed by: NURSE PRACTITIONER

## 2022-01-24 PROCEDURE — 87510 GARDNER VAG DNA DIR PROBE: CPT | Performed by: NURSE PRACTITIONER

## 2022-01-24 PROCEDURE — 3008F PR BODY MASS INDEX (BMI) DOCUMENTED: ICD-10-PCS | Mod: CPTII,S$GLB,, | Performed by: NURSE PRACTITIONER

## 2022-01-24 PROCEDURE — 99999 PR PBB SHADOW E&M-EST. PATIENT-LVL III: ICD-10-PCS | Mod: PBBFAC,,, | Performed by: NURSE PRACTITIONER

## 2022-01-24 PROCEDURE — 87591 N.GONORRHOEAE DNA AMP PROB: CPT | Performed by: NURSE PRACTITIONER

## 2022-01-24 PROCEDURE — 1159F MED LIST DOCD IN RCRD: CPT | Mod: CPTII,S$GLB,, | Performed by: NURSE PRACTITIONER

## 2022-01-24 PROCEDURE — 3078F DIAST BP <80 MM HG: CPT | Mod: CPTII,S$GLB,, | Performed by: NURSE PRACTITIONER

## 2022-01-24 PROCEDURE — 99213 PR OFFICE/OUTPT VISIT, EST, LEVL III, 20-29 MIN: ICD-10-PCS | Mod: S$GLB,,, | Performed by: NURSE PRACTITIONER

## 2022-01-24 PROCEDURE — 3078F PR MOST RECENT DIASTOLIC BLOOD PRESSURE < 80 MM HG: ICD-10-PCS | Mod: CPTII,S$GLB,, | Performed by: NURSE PRACTITIONER

## 2022-01-24 PROCEDURE — 3074F PR MOST RECENT SYSTOLIC BLOOD PRESSURE < 130 MM HG: ICD-10-PCS | Mod: CPTII,S$GLB,, | Performed by: NURSE PRACTITIONER

## 2022-01-24 PROCEDURE — 3074F SYST BP LT 130 MM HG: CPT | Mod: CPTII,S$GLB,, | Performed by: NURSE PRACTITIONER

## 2022-01-24 PROCEDURE — 99999 PR PBB SHADOW E&M-EST. PATIENT-LVL III: CPT | Mod: PBBFAC,,, | Performed by: NURSE PRACTITIONER

## 2022-01-24 PROCEDURE — 1159F PR MEDICATION LIST DOCUMENTED IN MEDICAL RECORD: ICD-10-PCS | Mod: CPTII,S$GLB,, | Performed by: NURSE PRACTITIONER

## 2022-01-24 PROCEDURE — 99213 OFFICE O/P EST LOW 20 MIN: CPT | Mod: S$GLB,,, | Performed by: NURSE PRACTITIONER

## 2022-01-24 RX ORDER — FLUCONAZOLE 150 MG/1
150 TABLET ORAL DAILY
Qty: 1 TABLET | Refills: 0 | Status: SHIPPED | OUTPATIENT
Start: 2022-01-24 | End: 2022-01-25

## 2022-01-24 NOTE — PROGRESS NOTES
CC: Vaginal Discharge    Karli Jerez is a 56 y.o. female  presents with complaint of vaginal discharge, itching, and irritation for the past week. She desires STD testing. Very stressed/emotional, her brother passed away yesterday, very close relationship.     ROS:  GENERAL: No fever, chills, fatigability or weight loss.  VULVAR: No pain, no lesions and no itching.  VAGINAL: No relaxation, itching and discharge, no abnormal bleeding and no lesions.  ABDOMEN: No abdominal pain. Denies nausea. Denies vomiting. No diarrhea. No constipation  BREAST: Denies pain. No lumps. No discharge.  URINARY: No incontinence, no nocturia, no frequency and no dysuria.  CARDIOVASCULAR: No chest pain. No shortness of breath. No leg cramps.  NEUROLOGICAL: No headaches. No vision changes.    PHYSICAL EXAM:  VULVA: normal appearing vulva with no masses, tenderness or lesions   VAGINA: normal appearing vagina with normal color and moderate amount of thick, white discharge, no lesions   CERVIX: normal appearing cervix without discharge or lesions   UTERUS: uterus is normal size, shape, consistency and nontender   ADNEXA: normal adnexa in size, nontender and no masses    ASSESSMENT and PLAN:    ICD-10-CM ICD-9-CM    1. Acute vaginitis  N76.0 616.10 fluconazole (DIFLUCAN) 150 MG Tab   2. Feeling grief  F43.21 309.0 Ambulatory referral/consult to Psychology     Affirm/GC. Diflucan for suspected yeast infection.    Referral for grief counselor.     Patient was counseled today on vaginitis prevention including :  a. avoiding feminine products such as deoderant soaps, body wash, bubble bath, douches, scented toilet paper, deoderant tampons or pads, feminine wipes, chronic pad use, etc.  b. avoiding other vulvovaginal irritants such as long hot baths, humidity, tight, synthetic clothing, chlorine and sitting around in wet bathing suits  c. wearing cotton underwear, avoiding thong underwear and no underwear to bed  d. taking showers  instead of baths and use a hair dryer on cool setting afterwards to dry  e. wearing cotton to exercise and shower immediately after exercise and change clothes  f. using polyurethane condoms without spermicide if sexually active and symptoms are triggered by intercourse    FOLLOW UP: PRN lack of improvement.

## 2022-01-26 LAB
C TRACH DNA SPEC QL NAA+PROBE: NOT DETECTED
CANDIDA RRNA VAG QL PROBE: POSITIVE
G VAGINALIS RRNA GENITAL QL PROBE: POSITIVE
N GONORRHOEA DNA SPEC QL NAA+PROBE: NOT DETECTED
T VAGINALIS RRNA GENITAL QL PROBE: NEGATIVE

## 2022-01-27 ENCOUNTER — PATIENT MESSAGE (OUTPATIENT)
Dept: OBSTETRICS AND GYNECOLOGY | Facility: CLINIC | Age: 57
End: 2022-01-27
Payer: COMMERCIAL

## 2022-01-27 DIAGNOSIS — N76.0 BACTERIAL VAGINITIS: Primary | ICD-10-CM

## 2022-01-27 DIAGNOSIS — B96.89 BACTERIAL VAGINITIS: Primary | ICD-10-CM

## 2022-01-27 RX ORDER — METRONIDAZOLE 500 MG/1
500 TABLET ORAL 2 TIMES DAILY
Qty: 14 TABLET | Refills: 0 | Status: SHIPPED | OUTPATIENT
Start: 2022-01-27 | End: 2022-02-03

## 2022-11-15 ENCOUNTER — OFFICE VISIT (OUTPATIENT)
Dept: OBSTETRICS AND GYNECOLOGY | Facility: CLINIC | Age: 57
End: 2022-11-15
Payer: COMMERCIAL

## 2022-11-15 VITALS
BODY MASS INDEX: 36.51 KG/M2 | WEIGHT: 219.13 LBS | HEIGHT: 65 IN | DIASTOLIC BLOOD PRESSURE: 80 MMHG | SYSTOLIC BLOOD PRESSURE: 132 MMHG

## 2022-11-15 DIAGNOSIS — Z12.31 BREAST CANCER SCREENING BY MAMMOGRAM: ICD-10-CM

## 2022-11-15 DIAGNOSIS — Z01.419 WELL WOMAN EXAM WITH ROUTINE GYNECOLOGICAL EXAM: Primary | ICD-10-CM

## 2022-11-15 PROCEDURE — 3075F PR MOST RECENT SYSTOLIC BLOOD PRESS GE 130-139MM HG: ICD-10-PCS | Mod: CPTII,S$GLB,, | Performed by: STUDENT IN AN ORGANIZED HEALTH CARE EDUCATION/TRAINING PROGRAM

## 2022-11-15 PROCEDURE — 3008F PR BODY MASS INDEX (BMI) DOCUMENTED: ICD-10-PCS | Mod: CPTII,S$GLB,, | Performed by: STUDENT IN AN ORGANIZED HEALTH CARE EDUCATION/TRAINING PROGRAM

## 2022-11-15 PROCEDURE — 87491 CHLMYD TRACH DNA AMP PROBE: CPT | Performed by: STUDENT IN AN ORGANIZED HEALTH CARE EDUCATION/TRAINING PROGRAM

## 2022-11-15 PROCEDURE — 81514 NFCT DS BV&VAGINITIS DNA ALG: CPT | Performed by: STUDENT IN AN ORGANIZED HEALTH CARE EDUCATION/TRAINING PROGRAM

## 2022-11-15 PROCEDURE — 1159F PR MEDICATION LIST DOCUMENTED IN MEDICAL RECORD: ICD-10-PCS | Mod: CPTII,S$GLB,, | Performed by: STUDENT IN AN ORGANIZED HEALTH CARE EDUCATION/TRAINING PROGRAM

## 2022-11-15 PROCEDURE — 3079F PR MOST RECENT DIASTOLIC BLOOD PRESSURE 80-89 MM HG: ICD-10-PCS | Mod: CPTII,S$GLB,, | Performed by: STUDENT IN AN ORGANIZED HEALTH CARE EDUCATION/TRAINING PROGRAM

## 2022-11-15 PROCEDURE — 3075F SYST BP GE 130 - 139MM HG: CPT | Mod: CPTII,S$GLB,, | Performed by: STUDENT IN AN ORGANIZED HEALTH CARE EDUCATION/TRAINING PROGRAM

## 2022-11-15 PROCEDURE — 3008F BODY MASS INDEX DOCD: CPT | Mod: CPTII,S$GLB,, | Performed by: STUDENT IN AN ORGANIZED HEALTH CARE EDUCATION/TRAINING PROGRAM

## 2022-11-15 PROCEDURE — 99396 PR PREVENTIVE VISIT,EST,40-64: ICD-10-PCS | Mod: S$GLB,,, | Performed by: STUDENT IN AN ORGANIZED HEALTH CARE EDUCATION/TRAINING PROGRAM

## 2022-11-15 PROCEDURE — 99396 PREV VISIT EST AGE 40-64: CPT | Mod: S$GLB,,, | Performed by: STUDENT IN AN ORGANIZED HEALTH CARE EDUCATION/TRAINING PROGRAM

## 2022-11-15 PROCEDURE — 99999 PR PBB SHADOW E&M-EST. PATIENT-LVL III: CPT | Mod: PBBFAC,,, | Performed by: STUDENT IN AN ORGANIZED HEALTH CARE EDUCATION/TRAINING PROGRAM

## 2022-11-15 PROCEDURE — 87591 N.GONORRHOEAE DNA AMP PROB: CPT | Performed by: STUDENT IN AN ORGANIZED HEALTH CARE EDUCATION/TRAINING PROGRAM

## 2022-11-15 PROCEDURE — 99999 PR PBB SHADOW E&M-EST. PATIENT-LVL III: ICD-10-PCS | Mod: PBBFAC,,, | Performed by: STUDENT IN AN ORGANIZED HEALTH CARE EDUCATION/TRAINING PROGRAM

## 2022-11-15 PROCEDURE — 1159F MED LIST DOCD IN RCRD: CPT | Mod: CPTII,S$GLB,, | Performed by: STUDENT IN AN ORGANIZED HEALTH CARE EDUCATION/TRAINING PROGRAM

## 2022-11-15 PROCEDURE — 3079F DIAST BP 80-89 MM HG: CPT | Mod: CPTII,S$GLB,, | Performed by: STUDENT IN AN ORGANIZED HEALTH CARE EDUCATION/TRAINING PROGRAM

## 2022-11-15 NOTE — PATIENT INSTRUCTIONS
Make sure the probiotic contains L. Acidophilus, L. Rhamnosus, and L. Fermentum. Suggested brands include:  - Natural Factors Ultimate Probiotic, Women's Formula  - Provella  -Lay Ultra FemFlora

## 2022-11-15 NOTE — PROGRESS NOTES
History & Physical  Gynecology      SUBJECTIVE:     Chief Complaint: No chief complaint on file.       History of Present Illness:  ANNUAL EXAM, also reports vaginal dryness/itching    Menstrual History: s/p TLH/BS by Dr. Ponce in 2018 for AUB-L   Obstetric Hx:   STD/STI Hx: Denies any history of STD's  Sexually Active: male  Family history: Denies any personal or family history of GYN/colon cancers   Social: Wears seatbelts. Exercises. Feels safe at home  Last pap: n/a, never abnormal   Last mammo:2021  Colonoscopy: with primary care  Vitamins: not taking    10/21 Outside ER US: 3.6cm adnexal mass on left found. Small, fat-containing hernia of umbilicus also appreciated    Follow upTVUS:  Uterus:   Absent   Right ovary:   Size: 5.5 x 1.8 x 3.3 cm   Appearance: 2.2 cm complex cyst.   Vascular flow: Normal.   Left ovary:   Size: 1.8 x 1.8 x 3.1 cm   Appearance: Normal   Vascular Flow: Normal.   Free Fluid:   Small volume   Impression:   Complex cyst in the right adnexa with small volume free fluid.  Findings may be physiologic in a premenopausal patient.  If there is continued concern, 8-10 week follow up may be obtained to ensure resolution.   Prior hysterectomy.       Review of patient's allergies indicates:   Allergen Reactions    Pcn [penicillins] Anaphylaxis    Phenazopyridine Shortness Of Breath    Amoxicillin Swelling       Past Medical History:   Diagnosis Date    Acid reflux     Anemia      Past Surgical History:   Procedure Laterality Date    COLONOSCOPY      CYSTOSCOPY N/A 2018    Procedure: CYSTOSCOPY;  Surgeon: Talia Ponce MD;  Location: [facility];  Service: OB/GYN;  Laterality: N/A;    ESOPHAGOGASTRODUODENOSCOPY      HYSTERECTOMY  2018    LAPAROSCOPIC SALPINGECTOMY Bilateral 2018    Procedure: SALPINGECTOMY, LAPAROSCOPIC;  Surgeon: Talia Ponce MD;  Location: [facility] OR;  Service: OB/GYN;  Laterality: Bilateral;    LAPAROSCOPIC TOTAL HYSTERECTOMY N/A 2018    Procedure:  HYSTERECTOMY-TOTAL LAPAROSCOPIC (TLH);  Surgeon: Talia Ponce MD;  Location: [facility];  Service: OB/GYN;  Laterality: N/A;     OB History          1    Para   1    Term   1            AB        Living   1         SAB        IAB        Ectopic        Multiple        Live Births   1               Family History   Problem Relation Age of Onset    Breast cancer Neg Hx     Colon cancer Neg Hx     Ovarian cancer Neg Hx      Social History     Tobacco Use    Smoking status: Never    Smokeless tobacco: Never   Substance Use Topics    Alcohol use: No    Drug use: No       Current Outpatient Medications   Medication Sig    albuterol (PROVENTIL/VENTOLIN HFA) 90 mcg/actuation inhaler TK 2 PUFFS INTO THE LUNGS Q 4 H PRN    EPINEPHrine (EPIPEN) 0.3 mg/0.3 mL AtIn Inject 0.3 mg into the muscle.    famotidine (PEPCID) 20 MG tablet Take 20 mg by mouth.    hydroCHLOROthiazide (HYDRODIURIL) 12.5 MG Tab Take 12.5 mg by mouth every morning.    metroNIDAZOLE (METROGEL) 0.75 % vaginal gel Place 1 applicator vaginally once daily.     No current facility-administered medications for this visit.         Review of Systems:  Review of Systems   Constitutional:  Negative for activity change, appetite change, fever and unexpected weight change.   Respiratory:  Negative for cough and shortness of breath.    Cardiovascular:  Negative for chest pain.   Gastrointestinal:  Negative for abdominal pain, blood in stool, constipation, diarrhea, nausea and vomiting.   Genitourinary:  Positive for vaginal dryness. Negative for dyspareunia, dysuria, hematuria, pelvic pain, vaginal bleeding, vaginal discharge, vaginal pain, urinary incontinence, postcoital bleeding and vaginal odor.   Integumentary:  Positive for breast tenderness. Negative for breast mass.   Breast: Positive for lump (no changes) and tenderness.Negative for mass     OBJECTIVE:     Physical Exam:  Physical Exam  Vitals reviewed.   Constitutional:       General: She is  not in acute distress.     Appearance: She is well-developed. She is not diaphoretic.   HENT:      Head: Normocephalic and atraumatic.   Eyes:      Conjunctiva/sclera: Conjunctivae normal.   Cardiovascular:      Rate and Rhythm: Normal rate.   Pulmonary:      Effort: Pulmonary effort is normal.   Chest:   Breasts:     Right: Normal.      Left: Normal.   Abdominal:      General: There is no distension.      Palpations: Abdomen is soft. There is no mass.      Tenderness: There is no abdominal tenderness. There is no guarding or rebound.   Genitourinary:     Labia:         Right: No rash, tenderness, lesion or injury.         Left: No rash, tenderness, lesion or injury.       Vagina: No signs of injury and foreign body. No vaginal discharge, erythema, tenderness or bleeding.      Cervix: No friability.      Adnexa:         Right: No mass, tenderness or fullness.          Left: No mass, tenderness or fullness.        Comments: Uterus absent, adnexa not palpated. Cervical cuff intact.  Musculoskeletal:         General: Normal range of motion.      Cervical back: Normal range of motion.   Skin:     General: Skin is warm and dry.   Neurological:      Mental Status: She is alert.         ASSESSMENT:     No diagnosis found.         Plan:      WWE  - Vaccines utd  - Pap n/a  - Mammogram ordered   - Daily vitamin discussed.  - Physical exam normal. VSS.  - affirm collected for vaginal irritation -atrophy not an issue from what I can see today  - RTC for annual or PRN.      Counseling time: 15 minutes   RTC one year for annual or PRN    [place]

## 2022-11-25 ENCOUNTER — PATIENT MESSAGE (OUTPATIENT)
Dept: OBSTETRICS AND GYNECOLOGY | Facility: CLINIC | Age: 57
End: 2022-11-25
Payer: COMMERCIAL

## 2022-11-28 ENCOUNTER — PATIENT MESSAGE (OUTPATIENT)
Dept: OBSTETRICS AND GYNECOLOGY | Facility: CLINIC | Age: 57
End: 2022-11-28
Payer: COMMERCIAL

## 2022-11-28 RX ORDER — ESTRADIOL 0.1 MG/G
1 CREAM VAGINAL DAILY
Qty: 42.5 G | Refills: 1 | Status: SHIPPED | OUTPATIENT
Start: 2022-11-28 | End: 2024-02-26

## 2023-01-09 ENCOUNTER — OFFICE VISIT (OUTPATIENT)
Dept: URGENT CARE | Facility: CLINIC | Age: 58
End: 2023-01-09
Payer: COMMERCIAL

## 2023-01-09 VITALS
BODY MASS INDEX: 35.99 KG/M2 | HEART RATE: 87 BPM | WEIGHT: 216 LBS | HEIGHT: 65 IN | TEMPERATURE: 98 F | OXYGEN SATURATION: 98 % | RESPIRATION RATE: 18 BRPM | DIASTOLIC BLOOD PRESSURE: 75 MMHG | SYSTOLIC BLOOD PRESSURE: 121 MMHG

## 2023-01-09 DIAGNOSIS — N89.8 VAGINAL DISCHARGE: Primary | ICD-10-CM

## 2023-01-09 DIAGNOSIS — N89.8 VAGINAL ITCHING: ICD-10-CM

## 2023-01-09 PROCEDURE — 1160F PR REVIEW ALL MEDS BY PRESCRIBER/CLIN PHARMACIST DOCUMENTED: ICD-10-PCS | Mod: CPTII,S$GLB,, | Performed by: NURSE PRACTITIONER

## 2023-01-09 PROCEDURE — 3078F DIAST BP <80 MM HG: CPT | Mod: CPTII,S$GLB,, | Performed by: NURSE PRACTITIONER

## 2023-01-09 PROCEDURE — 1159F PR MEDICATION LIST DOCUMENTED IN MEDICAL RECORD: ICD-10-PCS | Mod: CPTII,S$GLB,, | Performed by: NURSE PRACTITIONER

## 2023-01-09 PROCEDURE — 3074F SYST BP LT 130 MM HG: CPT | Mod: CPTII,S$GLB,, | Performed by: NURSE PRACTITIONER

## 2023-01-09 PROCEDURE — 3078F PR MOST RECENT DIASTOLIC BLOOD PRESSURE < 80 MM HG: ICD-10-PCS | Mod: CPTII,S$GLB,, | Performed by: NURSE PRACTITIONER

## 2023-01-09 PROCEDURE — 99204 PR OFFICE/OUTPT VISIT, NEW, LEVL IV, 45-59 MIN: ICD-10-PCS | Mod: S$GLB,,, | Performed by: NURSE PRACTITIONER

## 2023-01-09 PROCEDURE — 81514 NFCT DS BV&VAGINITIS DNA ALG: CPT | Performed by: NURSE PRACTITIONER

## 2023-01-09 PROCEDURE — 3008F BODY MASS INDEX DOCD: CPT | Mod: CPTII,S$GLB,, | Performed by: NURSE PRACTITIONER

## 2023-01-09 PROCEDURE — 3074F PR MOST RECENT SYSTOLIC BLOOD PRESSURE < 130 MM HG: ICD-10-PCS | Mod: CPTII,S$GLB,, | Performed by: NURSE PRACTITIONER

## 2023-01-09 PROCEDURE — 99204 OFFICE O/P NEW MOD 45 MIN: CPT | Mod: S$GLB,,, | Performed by: NURSE PRACTITIONER

## 2023-01-09 PROCEDURE — 1159F MED LIST DOCD IN RCRD: CPT | Mod: CPTII,S$GLB,, | Performed by: NURSE PRACTITIONER

## 2023-01-09 PROCEDURE — 3008F PR BODY MASS INDEX (BMI) DOCUMENTED: ICD-10-PCS | Mod: CPTII,S$GLB,, | Performed by: NURSE PRACTITIONER

## 2023-01-09 PROCEDURE — 1160F RVW MEDS BY RX/DR IN RCRD: CPT | Mod: CPTII,S$GLB,, | Performed by: NURSE PRACTITIONER

## 2023-01-09 RX ORDER — TERCONAZOLE 8 MG/G
1 CREAM VAGINAL NIGHTLY
Qty: 20 G | Refills: 0 | Status: SHIPPED | OUTPATIENT
Start: 2023-01-09 | End: 2023-01-12

## 2023-01-09 NOTE — PROGRESS NOTES
"Subjective:       Patient ID: Karli Jerez is a 57 y.o. female.    Vitals:  height is 5' 5" (1.651 m) and weight is 98 kg (216 lb). Her temporal temperature is 97.5 °F (36.4 °C). Her blood pressure is 121/75 and her pulse is 87. Her respiration is 18 and oxygen saturation is 98%.     Chief Complaint: Female  Problem    Pt is a 57 y.o female who presents today c/o vaginal discharge/discomfort that started a few days ago. She has not tried anything for her symptoms. She has had a yeast infection in the past and she thinks this is the same. Pt was treated w/ terazole and stated it worked well for her. Pt has no concerns for any STDs today.     Female  Problem  The patient's primary symptoms include vaginal discharge. The patient's pertinent negatives include no genital itching, genital lesions, genital odor, genital rash, missed menses, pelvic pain or vaginal bleeding. This is a new problem. The current episode started in the past 7 days. The patient is experiencing no pain. She is not pregnant. Pertinent negatives include no abdominal pain, anorexia, back pain, chills, constipation, diarrhea, discolored urine, dysuria, fever, flank pain, frequency, headaches, hematuria, joint pain, joint swelling, nausea, painful intercourse, rash, sore throat, urgency or vomiting. The vaginal discharge was white. Nothing aggravates the symptoms. She has tried nothing for the symptoms. She is sexually active.     Constitution: Negative for chills and fever.   HENT:  Negative for sore throat.    Gastrointestinal:  Negative for abdominal pain, nausea, vomiting, constipation and diarrhea.   Genitourinary:  Positive for vaginal discharge. Negative for dysuria, frequency, urgency, flank pain, hematuria, missed menses and pelvic pain.   Musculoskeletal:  Negative for back pain.   Skin:  Negative for rash.   Neurological:  Negative for headaches.     Objective:      Physical Exam   Constitutional: She is oriented to person, place, " and time. She appears well-developed.   HENT:   Head: Normocephalic and atraumatic.   Ears:   Right Ear: External ear normal.   Left Ear: External ear normal.   Nose: Nose normal. No nasal deformity. No epistaxis.   Mouth/Throat: Oropharynx is clear and moist and mucous membranes are normal.   Eyes: Lids are normal.   Neck: Trachea normal and phonation normal. Neck supple.   Cardiovascular: Normal pulses.   Pulmonary/Chest: Effort normal.   Abdominal: Normal appearance and bowel sounds are normal. She exhibits no distension. Soft. There is no abdominal tenderness.   Genitourinary:         Comments: deferred     Neurological: She is alert and oriented to person, place, and time.   Skin: Skin is warm, dry and intact.   Psychiatric: Her speech is normal and behavior is normal.   Nursing note and vitals reviewed.          Assessment:       1. Vaginal discharge    2. Vaginal itching          Plan:         Vaginal discharge  -     Bv, Trich, Candida by DNA Probe (Swab Only)  -     terconazole (TERAZOL 3) 0.8 % vaginal cream; Place 1 applicator vaginally every evening. for 3 days  Dispense: 20 g; Refill: 0    Vaginal itching  -     terconazole (TERAZOL 3) 0.8 % vaginal cream; Place 1 applicator vaginally every evening. for 3 days  Dispense: 20 g; Refill: 0         Patient Instructions   - You must understand that you have received an Urgent Care treatment only and that you may be released before all of your medical problems are known or treated.   - You, the patient, will arrange for follow up care as instructed.   - If your condition worsens or fails to improve we recommend that you receive another evaluation at the ER immediately or contact your PCP to discuss your concerns.   - You can call [phone] or [phone] to help schedule an appointment with the appropriate provider.

## 2023-01-09 NOTE — PATIENT INSTRUCTIONS
- You must understand that you have received an Urgent Care treatment only and that you may be released before all of your medical problems are known or treated.   - You, the patient, will arrange for follow up care as instructed.   - If your condition worsens or fails to improve we recommend that you receive another evaluation at the ER immediately or contact your PCP to discuss your concerns.   - You can call [phone] or [phone] to help schedule an appointment with the appropriate provider.

## 2023-01-25 ENCOUNTER — TELEPHONE (OUTPATIENT)
Dept: URGENT CARE | Facility: CLINIC | Age: 58
End: 2023-01-25
Payer: COMMERCIAL

## 2023-01-25 NOTE — TELEPHONE ENCOUNTER
----- Message from Matheus Silver PA-C sent at 1/12/2023  2:31 PM CST -----  Please call patient negative vaginosis swab results.  Thank you

## 2023-11-08 ENCOUNTER — HOSPITAL ENCOUNTER (OUTPATIENT)
Dept: RADIOLOGY | Facility: OTHER | Age: 58
Discharge: HOME | End: 2023-11-08
Attending: STUDENT IN AN ORGANIZED HEALTH CARE EDUCATION/TRAINING PROGRAM
Payer: COMMERCIAL

## 2023-11-08 DIAGNOSIS — Z01.419 WELL WOMAN EXAM WITH ROUTINE GYNECOLOGICAL EXAM: ICD-10-CM

## 2023-11-08 DIAGNOSIS — Z12.31 BREAST CANCER SCREENING BY MAMMOGRAM: ICD-10-CM

## 2023-11-08 PROCEDURE — 77063 BREAST TOMOSYNTHESIS BI: CPT | Mod: 26,,, | Performed by: RADIOLOGY

## 2023-11-08 PROCEDURE — 77067 MAMMO DIGITAL SCREENING BILAT WITH TOMO: ICD-10-PCS | Mod: 26,,, | Performed by: RADIOLOGY

## 2023-11-08 PROCEDURE — 77067 SCR MAMMO BI INCL CAD: CPT | Mod: 26,,, | Performed by: RADIOLOGY

## 2023-11-08 PROCEDURE — 77063 MAMMO DIGITAL SCREENING BILAT WITH TOMO: ICD-10-PCS | Mod: 26,,, | Performed by: RADIOLOGY

## 2023-11-08 PROCEDURE — 77067 SCR MAMMO BI INCL CAD: CPT | Mod: TC

## 2024-02-26 ENCOUNTER — OFFICE VISIT (OUTPATIENT)
Dept: OBSTETRICS AND GYNECOLOGY | Facility: CLINIC | Age: 59
End: 2024-02-26
Attending: STUDENT IN AN ORGANIZED HEALTH CARE EDUCATION/TRAINING PROGRAM
Payer: COMMERCIAL

## 2024-02-26 VITALS
DIASTOLIC BLOOD PRESSURE: 82 MMHG | HEIGHT: 66 IN | SYSTOLIC BLOOD PRESSURE: 126 MMHG | WEIGHT: 199.06 LBS | BODY MASS INDEX: 31.99 KG/M2

## 2024-02-26 DIAGNOSIS — N76.0 VAGINITIS AND VULVOVAGINITIS: ICD-10-CM

## 2024-02-26 DIAGNOSIS — Z01.419 WELL WOMAN EXAM WITH ROUTINE GYNECOLOGICAL EXAM: Primary | ICD-10-CM

## 2024-02-26 PROCEDURE — 3074F SYST BP LT 130 MM HG: CPT | Mod: CPTII,S$GLB,, | Performed by: STUDENT IN AN ORGANIZED HEALTH CARE EDUCATION/TRAINING PROGRAM

## 2024-02-26 PROCEDURE — 3008F BODY MASS INDEX DOCD: CPT | Mod: CPTII,S$GLB,, | Performed by: STUDENT IN AN ORGANIZED HEALTH CARE EDUCATION/TRAINING PROGRAM

## 2024-02-26 PROCEDURE — 1159F MED LIST DOCD IN RCRD: CPT | Mod: CPTII,S$GLB,, | Performed by: STUDENT IN AN ORGANIZED HEALTH CARE EDUCATION/TRAINING PROGRAM

## 2024-02-26 PROCEDURE — 3079F DIAST BP 80-89 MM HG: CPT | Mod: CPTII,S$GLB,, | Performed by: STUDENT IN AN ORGANIZED HEALTH CARE EDUCATION/TRAINING PROGRAM

## 2024-02-26 PROCEDURE — 99396 PREV VISIT EST AGE 40-64: CPT | Mod: S$GLB,,, | Performed by: STUDENT IN AN ORGANIZED HEALTH CARE EDUCATION/TRAINING PROGRAM

## 2024-02-26 RX ORDER — TERCONAZOLE 4 MG/G
1 CREAM VAGINAL NIGHTLY
Qty: 45 G | Refills: 0 | Status: SHIPPED | OUTPATIENT
Start: 2024-02-26 | End: 2024-03-04

## 2024-02-26 RX ORDER — ATORVASTATIN CALCIUM 10 MG/1
10 TABLET, FILM COATED ORAL
COMMUNITY

## 2024-02-26 RX ORDER — ESOMEPRAZOLE MAGNESIUM 40 MG/1
40 CAPSULE, DELAYED RELEASE ORAL
COMMUNITY

## 2024-02-26 RX ORDER — ASPIRIN 81 MG/1
1 TABLET ORAL DAILY
COMMUNITY

## 2024-02-26 NOTE — PROGRESS NOTES
History & Physical  Gynecology      SUBJECTIVE:     Chief Complaint: No chief complaint on file.         History of Present Illness:  ANNUAL EXAM, also reports vaginal discharge and pressure for 3-4 days. Milky white. Has not been wearing underwear lately. No changes in hygiene products/routines. Only water directly    Menstrual History: s/p TLH/BS by Dr. Ponce in 2018 for AUB-L   Obstetric Hx:   STD/STI Hx: Denies any history of STD's  Sexually Active: male  Family history: Denies any personal or family history of GYN/colon cancers   Social: Wears seatbelts. Exercises. Feels safe at home  Last pap: n/a. never abnormal   Last mammo: 2023  Colonoscopy: getting scheduled by GI  Vitamins: magnesium      Review of patient's allergies indicates:   Allergen Reactions    Pcn [penicillins] Anaphylaxis    Phenazopyridine Shortness Of Breath    Amoxicillin Swelling       Past Medical History:   Diagnosis Date    Acid reflux     Anemia      Past Surgical History:   Procedure Laterality Date    COLONOSCOPY      CYSTOSCOPY N/A 2018    Procedure: CYSTOSCOPY;  Surgeon: Talia Ponce MD;  Location: [facility];  Service: OB/GYN;  Laterality: N/A;    ESOPHAGOGASTRODUODENOSCOPY      HYSTERECTOMY  2018    LAPAROSCOPIC SALPINGECTOMY Bilateral 2018    Procedure: SALPINGECTOMY, LAPAROSCOPIC;  Surgeon: Talia Ponce MD;  Location: [facility] OR;  Service: OB/GYN;  Laterality: Bilateral;    LAPAROSCOPIC TOTAL HYSTERECTOMY N/A 2018    Procedure: HYSTERECTOMY-TOTAL LAPAROSCOPIC ([facility]);  Surgeon: Talia Ponce MD;  Location: [facility];  Service: OB/GYN;  Laterality: N/A;     OB History          1    Para   1    Term   1            AB        Living   1         SAB        IAB        Ectopic        Multiple        Live Births   1               Family History   Problem Relation Age of Onset    Breast cancer Neg Hx     Colon cancer Neg Hx     Ovarian cancer Neg Hx      Social History     Tobacco Use     Smoking status: Never    Smokeless tobacco: Never   Substance Use Topics    Alcohol use: No    Drug use: No       Current Outpatient Medications   Medication Sig    albuterol (PROVENTIL/VENTOLIN HFA) 90 mcg/actuation inhaler TK 2 PUFFS INTO THE LUNGS Q 4 H PRN    EPINEPHrine (EPIPEN) 0.3 mg/0.3 mL AtIn Inject 0.3 mg into the muscle.    estradioL (ESTRACE) 0.01 % (0.1 mg/gram) vaginal cream Place 1 g vaginally once daily. Apply pea-sized amount to vagina nightly for 2 weeks then twice a week there after    famotidine (PEPCID) 20 MG tablet Take 20 mg by mouth.    hydroCHLOROthiazide (HYDRODIURIL) 12.5 MG Tab Take 12.5 mg by mouth every morning.    metroNIDAZOLE (METROGEL) 0.75 % vaginal gel Place 1 applicator vaginally once daily.     No current facility-administered medications for this visit.         Review of Systems:  Review of Systems   Constitutional:  Negative for activity change, appetite change, fever and unexpected weight change.   Respiratory:  Negative for cough and shortness of breath.    Cardiovascular:  Negative for chest pain.   Gastrointestinal:  Negative for abdominal pain, blood in stool, constipation, diarrhea, nausea and vomiting.   Genitourinary:  Positive for vaginal discharge and vaginal pain. Negative for dyspareunia, dysuria, hematuria, pelvic pain, vaginal bleeding, urinary incontinence, postcoital bleeding, vaginal dryness and vaginal odor.   Integumentary:  Negative for breast mass.   Breast: Negative for lump (no changes) and mass       OBJECTIVE:     Physical Exam:  Physical Exam  Vitals reviewed.   Constitutional:       General: She is not in acute distress.     Appearance: She is well-developed. She is not diaphoretic.   HENT:      Head: Normocephalic and atraumatic.   Eyes:      Conjunctiva/sclera: Conjunctivae normal.   Cardiovascular:      Rate and Rhythm: Normal rate.   Pulmonary:      Effort: Pulmonary effort is normal.   Chest:   Breasts:     Right: Normal.      Left: Normal.    Abdominal:      General: There is no distension.      Palpations: Abdomen is soft. There is no mass.      Tenderness: There is no abdominal tenderness. There is no guarding or rebound.   Genitourinary:     Labia:         Right: No rash, tenderness, lesion or injury.         Left: No rash, tenderness, lesion or injury.       Vagina: No signs of injury and foreign body. No vaginal discharge, erythema, tenderness or bleeding.      Cervix: No friability.      Adnexa:         Right: No mass, tenderness or fullness.          Left: No mass, tenderness or fullness.        Comments: Uterus absent, adnexa not palpated. Cervical cuff intact.  Thick white discharge coats all of vaginal vault  Musculoskeletal:         General: Normal range of motion.      Cervical back: Normal range of motion.   Skin:     General: Skin is warm and dry.   Neurological:      Mental Status: She is alert.           ASSESSMENT:       ICD-10-CM ICD-9-CM    1. Well woman exam with routine gynecological exam  Z01.419 V72.31       2. Vaginitis and vulvovaginitis  N76.0 616.10                Plan:      WWE  - Vaccines utd  - Pap n/a  - Mammogram utd  - Colonoscopy per GI specialist   - Daily vitamin discussed.  - Physical exam normal. VSS. terconazole sent for suspected yeast infection. Reviewed recommended  hygiene products/routines. Recommend white cotton underwear  - RTC for annual or PRN.      Counseling time: 15 minutes   RTC one year for annual or PRN    [place]

## 2025-07-23 ENCOUNTER — HOSPITAL ENCOUNTER (OUTPATIENT)
Facility: HOSPITAL | Age: 60
Discharge: HOME | End: 2025-07-23
Attending: EMERGENCY MEDICINE | Admitting: EMERGENCY MEDICINE
Payer: COMMERCIAL

## 2025-07-23 VITALS
HEART RATE: 57 BPM | TEMPERATURE: 98 F | HEIGHT: 66 IN | OXYGEN SATURATION: 99 % | BODY MASS INDEX: 27.64 KG/M2 | SYSTOLIC BLOOD PRESSURE: 115 MMHG | WEIGHT: 172 LBS | RESPIRATION RATE: 16 BRPM | DIASTOLIC BLOOD PRESSURE: 69 MMHG

## 2025-07-23 DIAGNOSIS — R07.9 CHEST PAIN: Primary | ICD-10-CM

## 2025-07-23 DIAGNOSIS — R53.1 WEAKNESS: ICD-10-CM

## 2025-07-23 LAB
ABSOLUTE EOSINOPHIL (OHS): 0.06 K/UL
ABSOLUTE MONOCYTE (OHS): 0.34 K/UL (ref 0.3–1)
ABSOLUTE NEUTROPHIL COUNT (OHS): 3.09 K/UL (ref 1.8–7.7)
ALBUMIN SERPL BCP-MCNC: 3.7 G/DL (ref 3.5–5.2)
ALP SERPL-CCNC: 93 UNIT/L (ref 40–150)
ALT SERPL W/O P-5'-P-CCNC: 14 UNIT/L (ref 10–44)
ANION GAP (OHS): 8 MMOL/L (ref 8–16)
AORTIC SIZE INDEX (SOV): 1.6 CM/M2
AORTIC SIZE INDEX: 1.5 CM/M2
ASCENDING AORTA: 2.9 CM
AST SERPL-CCNC: 16 UNIT/L (ref 11–45)
AV AREA BY CONTINUOUS VTI: 3.8 CM2
AV INDEX (PROSTH): 1.01
AV LVOT MEAN GRADIENT: 4 MMHG
AV LVOT PEAK GRADIENT: 7 MMHG
AV MEAN GRADIENT: 4 MMHG
AV PEAK GRADIENT: 10 MMHG
AV VALVE AREA BY VELOCITY RATIO: 3.3 CM²
AV VALVE AREA: 3.9 CM2
AV VELOCITY RATIO: 0.88
BASOPHILS # BLD AUTO: 0.04 K/UL
BASOPHILS NFR BLD AUTO: 0.8 %
BILIRUB SERPL-MCNC: 0.4 MG/DL (ref 0.1–1)
BNP SERPL-MCNC: 16 PG/ML (ref 0–99)
BSA FOR ECHO PROCEDURE: 1.91 M2
BUN SERPL-MCNC: 8 MG/DL (ref 6–20)
CALCIUM SERPL-MCNC: 9 MG/DL (ref 8.7–10.5)
CHLORIDE SERPL-SCNC: 109 MMOL/L (ref 95–110)
CO2 SERPL-SCNC: 25 MMOL/L (ref 23–29)
CREAT SERPL-MCNC: 0.6 MG/DL (ref 0.5–1.4)
CV ECHO LV RWT: 0.39 CM
CV STRESS BASE HR: 51 BPM
DIASTOLIC BLOOD PRESSURE: 75 MMHG
DOP CALC AO PEAK VEL: 1.6 M/S
DOP CALC AO VTI: 28.6 CM
DOP CALC LVOT AREA: 3.8 CM2
DOP CALC LVOT DIAMETER: 2.2 CM
DOP CALC LVOT PEAK VEL: 1.4 M/S
DOP CALC LVOT STROKE VOLUME: 110.2 CM3
DOP CALCLVOT PEAK VEL VTI: 29 CM
E WAVE DECELERATION TIME: 245 MS
E/A RATIO: 1.55
E/E' RATIO: 13 M/S
ECHO EF ESTIMATED: 69 %
ECHO LV POSTERIOR WALL: 0.9 CM (ref 0.6–1.1)
ERYTHROCYTE [DISTWIDTH] IN BLOOD BY AUTOMATED COUNT: 14.6 % (ref 11.5–14.5)
FRACTIONAL SHORTENING: 39.1 % (ref 28–44)
GFR SERPLBLD CREATININE-BSD FMLA CKD-EPI: >60 ML/MIN/1.73/M2
GLUCOSE SERPL-MCNC: 92 MG/DL (ref 70–110)
HCT VFR BLD AUTO: 34 % (ref 37–48.5)
HCV AB SERPL QL IA: NORMAL
HGB BLD-MCNC: 11.8 GM/DL (ref 12–16)
HIV 1+2 AB+HIV1 P24 AG SERPL QL IA: NORMAL
IMM GRANULOCYTES # BLD AUTO: 0.01 K/UL (ref 0–0.04)
IMM GRANULOCYTES NFR BLD AUTO: 0.2 % (ref 0–0.5)
INTERVENTRICULAR SEPTUM: 0.9 CM (ref 0.6–1.1)
IVC DIAMETER: 1.89 CM
IVRT: 80 MS
LA MAJOR: 5.8 CM
LA MINOR: 5.3 CM
LA WIDTH: 3.9 CM
LEFT ATRIUM SIZE: 3.4 CM
LEFT ATRIUM VOLUME INDEX: 33 ML/M2
LEFT ATRIUM VOLUME: 62 CM3
LEFT INTERNAL DIMENSION IN SYSTOLE: 2.8 CM (ref 2.1–4)
LEFT VENTRICLE DIASTOLIC VOLUME INDEX: 51.06 ML/M2
LEFT VENTRICLE DIASTOLIC VOLUME: 96 ML
LEFT VENTRICLE MASS INDEX: 73.3 G/M2
LEFT VENTRICLE SYSTOLIC VOLUME INDEX: 16 ML/M2
LEFT VENTRICLE SYSTOLIC VOLUME: 30 ML
LEFT VENTRICULAR INTERNAL DIMENSION IN DIASTOLE: 4.6 CM (ref 3.5–6)
LEFT VENTRICULAR MASS: 137.7 G
LV LATERAL E/E' RATIO: 10.3
LV SEPTAL E/E' RATIO: 16.1
LYMPHOCYTES # BLD AUTO: 1.69 K/UL (ref 1–4.8)
Lab: 1.7 CM/M
Lab: 1.8 CM/M
MAGNESIUM SERPL-MCNC: 2 MG/DL (ref 1.6–2.6)
MCH RBC QN AUTO: 28 PG (ref 27–31)
MCHC RBC AUTO-ENTMCNC: 34.7 G/DL (ref 32–36)
MCV RBC AUTO: 81 FL (ref 82–98)
MV A" WAVE DURATION": 77.07 MS
MV PEAK A VEL: 0.73 M/S
MV PEAK E VEL: 1.13 M/S
NUCLEATED RBC (/100WBC) (OHS): 0 /100 WBC
OHS CV CPX 1 MINUTE RECOVERY HEART RATE: 93 BPM
OHS CV CPX 85 PERCENT MAX PREDICTED HEART RATE MALE: 138
OHS CV CPX ESTIMATED METS: 12
OHS CV CPX MAX PREDICTED HEART RATE: 162
OHS CV CPX PATIENT HEIGHT IN: 66
OHS CV CPX PATIENT IS FEMALE: 1
OHS CV CPX PATIENT IS MALE: 0
OHS CV CPX PEAK DIASTOLIC BLOOD PRESSURE: 80 MMHG
OHS CV CPX PEAK HEAR RATE: 155 BPM
OHS CV CPX PEAK RATE PRESSURE PRODUCT: NORMAL
OHS CV CPX PEAK SYSTOLIC BLOOD PRESSURE: 192 MMHG
OHS CV CPX PERCENT MAX PREDICTED HEART RATE ACHIEVED: 100
OHS CV CPX RATE PRESSURE PRODUCT PRESENTING: 7548
OHS CV RV/LV RATIO: 0.63 CM
OHS QRS DURATION: 94 MS
OHS QTC CALCULATION: 439 MS
PISA TR MAX VEL: 2.7 M/S
PLATELET # BLD AUTO: 169 K/UL (ref 150–450)
PMV BLD AUTO: 11.7 FL (ref 9.2–12.9)
POTASSIUM SERPL-SCNC: 3.7 MMOL/L (ref 3.5–5.1)
PROT SERPL-MCNC: 7 GM/DL (ref 6–8.4)
PULM VEIN A" WAVE DURATION": 77.07 MS
PULM VEIN S/D RATIO: 1.08
PULMONIC VEIN PEAK A VELOCITY: 0.2 M/S
PV PEAK D VEL: 0.49 M/S
PV PEAK S VEL: 0.53 M/S
RA MAJOR: 4.75 CM
RA PRESSURE ESTIMATED: 3 MMHG
RA WIDTH: 3.07 CM
RBC # BLD AUTO: 4.21 M/UL (ref 4–5.4)
RELATIVE EOSINOPHIL (OHS): 1.1 %
RELATIVE LYMPHOCYTE (OHS): 32.3 % (ref 18–48)
RELATIVE MONOCYTE (OHS): 6.5 % (ref 4–15)
RELATIVE NEUTROPHIL (OHS): 59.1 % (ref 38–73)
RIGHT VENTRICLE DIASTOLIC BASEL DIMENSION: 2.9 CM
RV TB RVSP: 6 MMHG
RV TISSUE DOPPLER FREE WALL SYSTOLIC VELOCITY 1 (APICAL 4 CHAMBER VIEW): 11.69 CM/S
SINUS: 3.1 CM
SODIUM SERPL-SCNC: 142 MMOL/L (ref 136–145)
STJ: 2.7 CM
STRESS ECHO POST EXERCISE DUR MIN: 7 MINUTES
STRESS ECHO POST EXERCISE DUR SEC: 20 SECONDS
STRESS ST DEPRESSION: 1.2 MM
SYSTOLIC BLOOD PRESSURE: 148 MMHG
TDI LATERAL: 0.11 M/S
TDI SEPTAL: 0.07 M/S
TDI: 0.09 M/S
TRICUSPID ANNULAR PLANE SYSTOLIC EXCURSION: 2 CM
TROPONIN I SERPL HS-MCNC: 18 NG/L
TROPONIN I SERPL HS-MCNC: <3 NG/L
TROPONIN I SERPL HS-MCNC: <3 NG/L
TSH SERPL-ACNC: 1.6 UIU/ML (ref 0.4–4)
TV PEAK GRADIENT: 29 MMHG
TV REST PULMONARY ARTERY PRESSURE: 32 MMHG
WBC # BLD AUTO: 5.23 K/UL (ref 3.9–12.7)
Z-SCORE OF LEFT VENTRICULAR DIMENSION IN END DIASTOLE: -1.32
Z-SCORE OF LEFT VENTRICULAR DIMENSION IN END SYSTOLE: -1.15

## 2025-07-23 PROCEDURE — 85025 COMPLETE CBC W/AUTO DIFF WBC: CPT | Performed by: EMERGENCY MEDICINE

## 2025-07-23 PROCEDURE — 87389 HIV-1 AG W/HIV-1&-2 AB AG IA: CPT | Performed by: PHYSICIAN ASSISTANT

## 2025-07-23 PROCEDURE — 86803 HEPATITIS C AB TEST: CPT | Performed by: PHYSICIAN ASSISTANT

## 2025-07-23 PROCEDURE — G0378 HOSPITAL OBSERVATION PER HR: HCPCS

## 2025-07-23 PROCEDURE — 84484 ASSAY OF TROPONIN QUANT: CPT | Performed by: EMERGENCY MEDICINE

## 2025-07-23 PROCEDURE — 63600175 PHARM REV CODE 636 W HCPCS: Performed by: PHYSICIAN ASSISTANT

## 2025-07-23 PROCEDURE — 84443 ASSAY THYROID STIM HORMONE: CPT | Performed by: EMERGENCY MEDICINE

## 2025-07-23 PROCEDURE — 93005 ELECTROCARDIOGRAM TRACING: CPT

## 2025-07-23 PROCEDURE — 80053 COMPREHEN METABOLIC PANEL: CPT | Performed by: EMERGENCY MEDICINE

## 2025-07-23 PROCEDURE — 83735 ASSAY OF MAGNESIUM: CPT | Performed by: EMERGENCY MEDICINE

## 2025-07-23 PROCEDURE — 84484 ASSAY OF TROPONIN QUANT: CPT | Mod: 91 | Performed by: PHYSICIAN ASSISTANT

## 2025-07-23 PROCEDURE — 25000003 PHARM REV CODE 250: Performed by: EMERGENCY MEDICINE

## 2025-07-23 PROCEDURE — 93010 ELECTROCARDIOGRAM REPORT: CPT | Mod: ,,, | Performed by: INTERNAL MEDICINE

## 2025-07-23 PROCEDURE — 83880 ASSAY OF NATRIURETIC PEPTIDE: CPT | Performed by: EMERGENCY MEDICINE

## 2025-07-23 RX ORDER — HYDROCHLOROTHIAZIDE 12.5 MG/1
12.5 TABLET ORAL DAILY
COMMUNITY
Start: 2025-07-10

## 2025-07-23 RX ORDER — ASPIRIN 81 MG/1
81 TABLET ORAL DAILY
Status: DISCONTINUED | OUTPATIENT
Start: 2025-07-24 | End: 2025-07-23 | Stop reason: HOSPADM

## 2025-07-23 RX ORDER — DROPERIDOL 2.5 MG/ML
1.25 INJECTION, SOLUTION INTRAMUSCULAR; INTRAVENOUS
Status: DISCONTINUED | OUTPATIENT
Start: 2025-07-23 | End: 2025-07-23

## 2025-07-23 RX ORDER — NAPROXEN 500 MG/1
500 TABLET ORAL 2 TIMES DAILY
COMMUNITY
Start: 2025-01-25

## 2025-07-23 RX ORDER — SODIUM CHLORIDE 0.9 % (FLUSH) 0.9 %
10 SYRINGE (ML) INJECTION
Status: DISCONTINUED | OUTPATIENT
Start: 2025-07-23 | End: 2025-07-23 | Stop reason: HOSPADM

## 2025-07-23 RX ORDER — TALC
6 POWDER (GRAM) TOPICAL NIGHTLY PRN
Status: DISCONTINUED | OUTPATIENT
Start: 2025-07-23 | End: 2025-07-23 | Stop reason: HOSPADM

## 2025-07-23 RX ORDER — DIPHENHYDRAMINE HYDROCHLORIDE 50 MG/ML
12.5 INJECTION, SOLUTION INTRAMUSCULAR; INTRAVENOUS
Status: DISCONTINUED | OUTPATIENT
Start: 2025-07-23 | End: 2025-07-23

## 2025-07-23 RX ORDER — LIDOCAINE HYDROCHLORIDE 20 MG/ML
15 SOLUTION OROPHARYNGEAL ONCE
Status: DISCONTINUED | OUTPATIENT
Start: 2025-07-23 | End: 2025-07-23 | Stop reason: HOSPADM

## 2025-07-23 RX ORDER — ONDANSETRON HYDROCHLORIDE 2 MG/ML
4 INJECTION, SOLUTION INTRAVENOUS EVERY 8 HOURS PRN
Status: DISCONTINUED | OUTPATIENT
Start: 2025-07-23 | End: 2025-07-23 | Stop reason: HOSPADM

## 2025-07-23 RX ORDER — IBUPROFEN 800 MG/1
800 TABLET, FILM COATED ORAL EVERY 8 HOURS PRN
COMMUNITY
Start: 2025-07-19 | End: 2025-07-29

## 2025-07-23 RX ORDER — ASPIRIN 325 MG
325 TABLET ORAL
Status: COMPLETED | OUTPATIENT
Start: 2025-07-23 | End: 2025-07-23

## 2025-07-23 RX ORDER — ACETAMINOPHEN 500 MG
1000 TABLET ORAL EVERY 8 HOURS PRN
Status: DISCONTINUED | OUTPATIENT
Start: 2025-07-23 | End: 2025-07-23 | Stop reason: HOSPADM

## 2025-07-23 RX ORDER — ALUMINUM HYDROXIDE, MAGNESIUM HYDROXIDE, AND SIMETHICONE 1200; 120; 1200 MG/30ML; MG/30ML; MG/30ML
30 SUSPENSION ORAL ONCE
Status: DISCONTINUED | OUTPATIENT
Start: 2025-07-23 | End: 2025-07-23

## 2025-07-23 RX ADMIN — ASPIRIN 325 MG: 325 TABLET ORAL at 10:07

## 2025-07-23 RX ADMIN — SODIUM CHLORIDE, POTASSIUM CHLORIDE, SODIUM LACTATE AND CALCIUM CHLORIDE 500 ML: 600; 310; 30; 20 INJECTION, SOLUTION INTRAVENOUS at 12:07

## 2025-07-23 NOTE — Clinical Note
Diagnosis: Chest pain [685120]   Future Attending Provider: RUTHY MIN [[id number]]   Is the patient being sent to ED Observation?: Yes

## 2025-07-23 NOTE — ED NOTES
Patient comes into the emergency department by private vehicle with complaints of left side chest pain,hypotension,fatigue. Patient states that the pain started this . Patient denies SOB,chill,fever.      Review of patient's allergies indicates:   Allergen Reactions    Pcn [penicillins] Anaphylaxis    Phenazopyridine Shortness Of Breath    Amoxicillin Swelling     Past Medical History:   Diagnosis Date    Acid reflux     Anemia

## 2025-07-23 NOTE — PROGRESS NOTES
"ED Observation Unit  Progress Note      HPI   "60-year-old female, history of high cholesterol and hypertension complaining of generalized weakness and chest pain.  Patient reports that about 5 days ago she developed generalized fatigue.  Though symptoms have persisted.  Over the last couple days she had diarrhea and a queasiness in her stomach though no abdominal pain, no vomiting.  She feels like over this timeframe her blood pressure has been running low with a systolic in the 90s so she has held her antihypertensive.  She has had no fevers or chills.  She reports an 18 lb unintentional weight loss in the last month.  This morning around 3:00 a.m. she developed chest pain.  She describes it as a sticking pain in her left chest.  No associated diaphoresis shortness of breath or vomiting."     EDOU:   Patient clarifies some of the information in ED physician note.  She states that she HAS been trying to lose weight after finding out that she was prediabetic.  She has been exercising and has made some changes in her diet.  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  She feels that she has had some increased stress recently.  She has also had some diarrhea but states that it has not been a significant amount.  Has not eaten well over the past few days.  Her antihypertensive is low-dose of hydrochlorothiazide.   Her 2nd troponin increased from <3 to 18.  She was placed in observation for further cardiac testing given abnormal troponin and complaints of chest pain.  She denies chest pain currently, but reports some persistent but mild generalized weakness.       I reviewed the ED Provider Note dated 07/23/2025 prior to my evaluation of this patient.  I reviewed all labs and imaging performed in the Main ED, prior to patient being placed in Observation. " "Patient was placed in the ED Observation Unit for Chest pain.    Interval History   Doing well throughout ED OU stay, chest pain completely resolved.  Repeat troponin normal at less than 3. ECHO shows :   " Stress ECG: There is 1.2 mm of horizontal ST segment depression (II, III and aVF) noted during stress. There are no arrhythmias during stress. There is normal blood pressure response with stress.    ECG Conclusion: The ECG portion of the study is positive for ischemia.    Post-stress Echo: The left ventricle systolic function is normal. Right ventricle systolic function is normal.    Post-stress Conclusion: The study is negative with no echocardiographic evidence of stress induced ischemia."    I discussed this finding with cardiology fellow Dr. Mcwilliams who recommends discharge with outpatient follow up in Cardiology Clinic as the patient is currently asymptomatic and her biomarkers have returned to normal.       PMHx   Past Medical History:   Diagnosis Date    Acid reflux     Anemia       Past Surgical History:   Procedure Laterality Date    COLONOSCOPY      CYSTOSCOPY N/A 5/25/2018    Procedure: CYSTOSCOPY;  Surgeon: Talia Ponce MD;  Location: [facility];  Service: OB/GYN;  Laterality: N/A;    ESOPHAGOGASTRODUODENOSCOPY      HYSTERECTOMY  2018    LAPAROSCOPIC SALPINGECTOMY Bilateral 5/25/2018    Procedure: SALPINGECTOMY, LAPAROSCOPIC;  Surgeon: Talia Ponce MD;  Location: [facility] OR;  Service: OB/GYN;  Laterality: Bilateral;    LAPAROSCOPIC TOTAL HYSTERECTOMY N/A 5/25/2018    Procedure: HYSTERECTOMY-TOTAL LAPAROSCOPIC ([facility]);  Surgeon: Talia Ponce MD;  Location: [facility] OR;  Service: OB/GYN;  Laterality: N/A;        Family Hx   Family History   Problem Relation Name Age of Onset    Breast cancer Neg Hx      Colon cancer Neg Hx      Ovarian cancer Neg Hx          Social Hx   Social History     Socioeconomic History    Marital status: Single   Tobacco Use    Smoking status: Never    Smokeless " tobacco: Never   Substance and Sexual Activity    Alcohol use: No    Drug use: No    Sexual activity: Yes     Partners: Male     Birth control/protection: See Surgical Hx, None     Comment: Hysterectomy 5/25/18     Social Drivers of Health     Financial Resource Strain: Low Risk  (7/5/2025)    Received from [facility]    Overall Financial Resource Strain (CARDIA)     How hard is it for you to pay for the very basics like food, housing, medical care, and heating?: Not hard at all   Food Insecurity: No Food Insecurity (7/5/2025)    Received from [facility]    Hunger Vital Sign     Worried About Running Out of Food in the Last Year: Never true     Ran Out of Food in the Last Year: Never true   Transportation Needs: No Transportation Needs (7/5/2025)    Received from [facility]    PRAPARE - Transportation     In the past 12 months, has lack of transportation kept you from medical appointments or from getting medications?: No     In the past 12 months, has lack of transportation kept you from meetings, work, or from getting things needed for daily living?: No   Physical Activity: Sufficiently Active (7/5/2025)    Received from [facility]    Exercise Vital Sign     Days of Exercise per Week: 5 days     Minutes of Exercise per Session: 60 min   Stress: No Stress Concern Present (7/5/2025)    Received from [facility]    [place] [employer] - Occupational Stress Questionnaire     Do you feel stress - tense, restless, nervous, or anxious, or unable to sleep at night because your mind is troubled all the time - these days?: Only a little   Housing Stability: Unknown (7/5/2025)    Received from [facility]    Housing Stability Vital Sign     Unable to Pay for Housing in the Last Year: No        Vital Signs   Vitals:    07/23/25 0629 07/23/25 1121 07/23/25 1548   BP: 119/67 117/75    BP Location: Right arm     Pulse: 75 (!) 56    Resp: 18 18    Temp: 98 °F (36.7 °C)     TempSrc: Oral     SpO2: 97% 98%   "  Weight:   78 kg (172 lb)   Height:   5' 6" (1.676 m)        Review of Systems  Review of Systems   Constitutional:  Positive for malaise/fatigue and weight loss.   Respiratory:  Negative for shortness of breath.    Cardiovascular:  Negative for chest pain and leg swelling.   Gastrointestinal:  Negative for abdominal pain, nausea and vomiting.       Brief Physical Exam/Reassessment   Physical Exam  Vitals and nursing note reviewed.   Constitutional:       General: She is not in acute distress.     Appearance: She is not diaphoretic.   HENT:      Head: Normocephalic and atraumatic.   Eyes:      Conjunctiva/sclera: Conjunctivae normal.   Cardiovascular:      Rate and Rhythm: Normal rate and regular rhythm.      Heart sounds: Normal heart sounds. No murmur heard.     No friction rub. No gallop.   Pulmonary:      Effort: Pulmonary effort is normal. No respiratory distress.      Breath sounds: Normal breath sounds. No wheezing or rales.   Chest:      Chest wall: No tenderness.   Abdominal:      Palpations: Abdomen is soft.      Tenderness: There is no abdominal tenderness.   Musculoskeletal:         General: Normal range of motion.      Cervical back: Normal range of motion and neck supple.      Right lower leg: No tenderness. No edema.      Left lower leg: No tenderness. No edema.   Skin:     General: Skin is warm and dry.   Neurological:      Mental Status: She is alert and oriented to person, place, and time.   Psychiatric:         Mood and Affect: Affect normal.         Labs/Imaging   Labs Reviewed   CBC WITH DIFFERENTIAL - Abnormal       Result Value    WBC 5.23      RBC 4.21      HGB 11.8 (*)     HCT 34.0 (*)     MCV 81 (*)     MCH 28.0      MCHC 34.7      RDW 14.6 (*)     Platelet Count 169      MPV 11.7      Nucleated RBC 0      Neut % 59.1      Lymph % 32.3      Mono % 6.5      Eos % 1.1      Basophil % 0.8      Imm Grans % 0.2      Neut # 3.09      Lymph # 1.69      Mono # 0.34      Eos # 0.06      Baso # 0.04 "      Imm Grans # 0.01     TROPONIN I HIGH SENSITIVITY - Abnormal    Troponin High Sensitive 18 (*)    HEPATITIS C ANTIBODY - Normal    Hep C Ab Interp Non-Reactive     HIV 1 / 2 ANTIBODY - Normal    HIV 1/2 Ag/Ab Non-Reactive     COMPREHENSIVE METABOLIC PANEL - Normal    Sodium 142      Potassium 3.7      Chloride 109      CO2 25      Glucose 92      BUN 8      Creatinine 0.6      Calcium 9.0      Protein Total 7.0      Albumin 3.7      Bilirubin Total 0.4      ALP 93      AST 16      ALT 14      Anion Gap 8      eGFR >60     TROPONIN I HIGH SENSITIVITY - Normal    Troponin High Sensitive <3     B-TYPE NATRIURETIC PEPTIDE - Normal    BNP 16     TSH - Normal    TSH 1.601     MAGNESIUM - Normal    Magnesium  2.0     TROPONIN I HIGH SENSITIVITY - Normal    Troponin High Sensitive <3     CBC W/ AUTO DIFFERENTIAL    Narrative:     The following orders were created for panel order CBC auto differential.  Procedure                               Abnormality         Status                     ---------                               -----------         ------                     CBC with Differential[[id number]]       Abnormal            Final result                 Please view results for these tests on the individual orders.   HEP C VIRUS HOLD SPECIMEN      Imaging Results              X-Ray Chest PA And Lateral (Final result)  Result time 07/23/25 09:15:03      Final result by Juan Lin MD (07/23/25 09:15:03)                   Impression:      No acute cardiopulmonary findings.    Electronically signed by resident: Aquiles Oneal  Date:    07/23/2025  Time:    09:00    Electronically signed by: Juan Lin MD  Date:    07/23/2025  Time:    09:15               Narrative:    EXAMINATION:  XR CHEST PA AND LATERAL    CLINICAL HISTORY:  Chest Pain;    TECHNIQUE:  PA and lateral views of the chest were performed.    COMPARISON:  CT AP 06/05/2018.    FINDINGS:  Lung volumes are symmetric.  No focal consolidation.  No pleural  effusion or pneumothorax.    Mediastinal structures are midline.  Cardiomediastinal silhouette is normal size.    Soft tissues demonstrate no abnormality.  Osseous structures are intact.                                       I reviewed all labs, imaging, EKGs.     Plan   Will d/c with cardiology follow up.     I have discussed this case with ESTHER PONCE, cardiology fellow.

## 2025-07-23 NOTE — DISCHARGE INSTRUCTIONS
Diagnosis: Chest pain    Your workup here in the emergency department was reassuring.  You did have 1 mildly elevated troponin test and there were some changes during the EKG portion of your stress test, however the overall study was negative for ischemia.  If your blood pressure remains low, you should continue to hold your HCTZ.    We sent a referral to Cardiology for follow-up.  Please call to schedule follow up appointment.  If you start to have any worsening symptoms, please return to the emergency department immediately.

## 2025-07-23 NOTE — DISCHARGE SUMMARY
"ED Observation Unit  Discharge Summary        History of Present Illness:    "60-year-old female, history of high cholesterol and hypertension complaining of generalized weakness and chest pain.  Patient reports that about 5 days ago she developed generalized fatigue.  Though symptoms have persisted.  Over the last couple days she had diarrhea and a queasiness in her stomach though no abdominal pain, no vomiting.  She feels like over this timeframe her blood pressure has been running low with a systolic in the 90s so she has held her antihypertensive.  She has had no fevers or chills.  She reports an 18 lb unintentional weight loss in the last month.  This morning around 3:00 a.m. she developed chest pain.  She describes it as a sticking pain in her left chest.  No associated diaphoresis shortness of breath or vomiting."     EDOU:   Patient clarifies some of the information in ED physician note.  She states that she HAS been trying to lose weight after finding out that she was prediabetic.  She has been exercising and has made some changes in her diet.  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  She feels that she has had some increased stress recently.  She has also had some diarrhea but states that it has not been a significant amount.  Has not eaten well over the past few days.  Her antihypertensive is low-dose of hydrochlorothiazide.   Her 2nd troponin increased from <3 to 18.  She was placed in observation for further cardiac testing given abnormal troponin and complaints of chest pain.  She denies chest pain currently, but reports some persistent but mild generalized weakness.       I reviewed the ED Provider Note dated 07/23/2025 prior to my evaluation of this patient.  I reviewed all labs and imaging performed in the Main ED, prior to patient " being placed in Observation. Patient was placed in the ED Observation Unit for Chest pain.    Observation Course:    Stress test with horizontal ST depression during stress but overall study negative for ischemia. Discussed with cardiology who recommended outpatient follow up.  Chest pain completely resolved and troponin returned to normal.      Consultants:    Discussed with cardiology- no formal consult    Final Diagnosis:  Chest pain    Discharge Condition: Good    Disposition: Home or Self Care     Time spent on the discharge of the patient including review of hospital course with the patient. reviewing discharge medications and arranging follow-up care 35 minutes.  Patient was seen and examined on the date of discharge and determined to be suitable for discharge.    Follow Up:  Future Appointments   Date Time Provider Department Center   9/10/2025 11:00 AM Jacek Payne MD [id number]     Stressed the importance of follow-up, strict ED return precautions given.  Patient voiced understanding and is comfortable with discharge.

## 2025-07-23 NOTE — ED PROVIDER NOTES
Encounter Date: 7/23/2025       History     Chief Complaint   Patient presents with    Chest Pain     Pt states that she started to experience left sided chest pain at 3 AM. Pt describes pain as stabbing pain that comes and goes and rates as 5 out of 10.      60-year-old female, history of high cholesterol and hypertension,, complaining of generalized weakness and chest pain.  Patient reports that about 5 days ago she developed generalized fatigue.  Though symptoms have persisted.  Over the last couple days she had diarrhea and a queasiness in her stomach though no abdominal pain, no vomiting.  She feels like over this timeframe her blood pressure has been running low with a systolic in the 90s so she has held her antihypertensive.  She has had no fevers or chills.  She reports an 18 lb unintentional weight loss in the last month.  This morning around 3:00 a.m. she developed chest pain.  She describes it as a sticking pain in her left chest.  No associated diaphoresis shortness of breath or vomiting.    The history is provided by the patient.     Review of patient's allergies indicates:   Allergen Reactions    Pcn [penicillins] Anaphylaxis    Phenazopyridine Shortness Of Breath    Amoxicillin Swelling     Past Medical History:   Diagnosis Date    Acid reflux     Anemia      Past Surgical History:   Procedure Laterality Date    COLONOSCOPY      CYSTOSCOPY N/A 5/25/2018    Procedure: CYSTOSCOPY;  Surgeon: Talia Ponce MD;  Location: [facility];  Service: OB/GYN;  Laterality: N/A;    ESOPHAGOGASTRODUODENOSCOPY      HYSTERECTOMY  2018    LAPAROSCOPIC SALPINGECTOMY Bilateral 5/25/2018    Procedure: SALPINGECTOMY, LAPAROSCOPIC;  Surgeon: Talia Ponce MD;  Location: [facility];  Service: OB/GYN;  Laterality: Bilateral;    LAPAROSCOPIC TOTAL HYSTERECTOMY N/A 5/25/2018    Procedure: HYSTERECTOMY-TOTAL LAPAROSCOPIC (TLH);  Surgeon: Talia Ponce MD;  Location: [facility];  Service: OB/GYN;  Laterality: N/A;      Family History   Problem Relation Name Age of Onset    Breast cancer Neg Hx      Colon cancer Neg Hx      Ovarian cancer Neg Hx       Social History[1]  Review of Systems    Physical Exam     Initial Vitals [07/23/25 0629]   BP Pulse Resp Temp SpO2   119/67 75 18 98 °F (36.7 °C) 97 %      MAP       --         Physical Exam    Nursing note and vitals reviewed.  Constitutional: Vital signs are normal. She appears well-developed and well-nourished. She is not diaphoretic.  Non-toxic appearance. She does not appear ill. No distress.   HENT:   Head: Normocephalic and atraumatic. Mouth/Throat: Mucous membranes are normal. Mucous membranes are not dry.   Eyes: Conjunctivae and lids are normal. No scleral icterus.   Neck: Neck supple.   Normal range of motion.  Cardiovascular:  Normal rate and regular rhythm.           Pulmonary/Chest: Breath sounds normal. No respiratory distress. She exhibits no tenderness.   Abdominal: Abdomen is soft. She exhibits no distension. There is no abdominal tenderness. There is no rebound and no guarding.   Musculoskeletal:         General: No tenderness or edema.      Cervical back: Normal range of motion and neck supple.     Neurological: She is alert and oriented to person, place, and time.   Skin: Skin is warm, dry and intact. No pallor.   Psychiatric: She has a normal mood and affect. Her speech is normal and behavior is normal.         ED Course   Procedures  Labs Reviewed   CBC WITH DIFFERENTIAL - Abnormal       Result Value    WBC 5.23      RBC 4.21      HGB 11.8 (*)     HCT 34.0 (*)     MCV 81 (*)     MCH 28.0      MCHC 34.7      RDW 14.6 (*)     Platelet Count 169      MPV 11.7      Nucleated RBC 0      Neut % 59.1      Lymph % 32.3      Mono % 6.5      Eos % 1.1      Basophil % 0.8      Imm Grans % 0.2      Neut # 3.09      Lymph # 1.69      Mono # 0.34      Eos # 0.06      Baso # 0.04      Imm Grans # 0.01     TROPONIN I HIGH SENSITIVITY - Abnormal    Troponin High Sensitive 18  (*)    HEPATITIS C ANTIBODY - Normal    Hep C Ab Interp Non-Reactive     HIV 1 / 2 ANTIBODY - Normal    HIV 1/2 Ag/Ab Non-Reactive     COMPREHENSIVE METABOLIC PANEL - Normal    Sodium 142      Potassium 3.7      Chloride 109      CO2 25      Glucose 92      BUN 8      Creatinine 0.6      Calcium 9.0      Protein Total 7.0      Albumin 3.7      Bilirubin Total 0.4      ALP 93      AST 16      ALT 14      Anion Gap 8      eGFR >60     TROPONIN I HIGH SENSITIVITY - Normal    Troponin High Sensitive <3     B-TYPE NATRIURETIC PEPTIDE - Normal    BNP 16     TSH - Normal    TSH 1.601     MAGNESIUM - Normal    Magnesium  2.0     CBC W/ AUTO DIFFERENTIAL    Narrative:     The following orders were created for panel order CBC auto differential.  Procedure                               Abnormality         Status                     ---------                               -----------         ------                     CBC with Differential[[id number]]       Abnormal            Final result                 Please view results for these tests on the individual orders.   HEP C VIRUS HOLD SPECIMEN   TROPONIN I HIGH SENSITIVITY     EKG Readings: (Independently Interpreted)   Initial Reading: No STEMI. Rhythm: Normal Sinus Rhythm. Ectopy: No Ectopy. Conduction: Normal. ST Segments: Normal ST Segments. T Waves: Normal.     ECG Results              EKG 12-lead (Final result)        Collection Time Result Time QRS Duration OHS QTC Calculation    07/23/25 06:32:14 07/23/25 08:01:45 94 439                     Final result by Interface, Lab In [facility] (07/23/25 08:01:49)                   Narrative:    Test Reason : R07.9,    Vent. Rate :  77 BPM     Atrial Rate :  77 BPM     P-R Int : 128 ms          QRS Dur :  94 ms      QT Int : 388 ms       P-R-T Axes :  65  41  68 degrees    QTcB Int : 439 ms    Normal sinus rhythm  Nonspecific T wave abnormality  Abnormal ECG  When compared with ECG of 15-May-2018 12:51,  T wave inversion no longer  evident in Inferior leads  Nonspecific T wave abnormality now evident in Anterior-lateral leads  Confirmed by Obi Leiva ([id number]) on 7/23/2025 8:01:41 AM    Referred By:            Confirmed By: Obi Leiva                                  Imaging Results              X-Ray Chest PA And Lateral (Final result)  Result time 07/23/25 09:15:03      Final result by Juan Lin MD (07/23/25 09:15:03)                   Impression:      No acute cardiopulmonary findings.    Electronically signed by resident: Aquiles Oneal  Date:    07/23/2025  Time:    09:00    Electronically signed by: Juan Lin MD  Date:    07/23/2025  Time:    09:15               Narrative:    EXAMINATION:  XR CHEST PA AND LATERAL    CLINICAL HISTORY:  Chest Pain;    TECHNIQUE:  PA and lateral views of the chest were performed.    COMPARISON:  CT AP 06/05/2018.    FINDINGS:  Lung volumes are symmetric.  No focal consolidation.  No pleural effusion or pneumothorax.    Mediastinal structures are midline.  Cardiomediastinal silhouette is normal size.    Soft tissues demonstrate no abnormality.  Osseous structures are intact.                                       Medications   lactated ringers bolus 500 mL (has no administration in time range)   aspirin tablet 325 mg (325 mg Oral Given 7/23/25 1033)     Medical Decision Making  The DDx for generalized, non-focal weakness would be broad and would include, but not be limited to, such diagnoses as infection, dehydration, electrolyte disturbance, anemia, hypothyroidism, malignancy, polypharmacy, deconditioning, depression and neuromuscular disease.    -The neurologic exam does not reveal any focal deficits that would be suggestive of an acute stroke.  -The patient is hemodynamically stable and clinically well-appearing  -Emergent lab work is indicated at this time to work-up for generalized weakness.  -Labs: CBC, CMP, troponin given CP  -Imaging is indicated today: CXR  -Medications/Intravenous fluids: none at  this time  -Disposition: uncertain pending ED work-up      Amount and/or Complexity of Data Reviewed  External Data Reviewed: notes.  Labs: ordered. Decision-making details documented in ED Course.  Radiology: ordered and independent interpretation performed. Decision-making details documented in ED Course.  ECG/medicine tests: ordered and independent interpretation performed. Decision-making details documented in ED Course.    Risk  OTC drugs.  Decision regarding hospitalization.               ED Course as of 07/23/25 1140   Wed Jul 23, 2025   0634 Triage EKG reviewed: No STEMI [LP]   0741 WBC: 5.23 [AS]   0741 Hemoglobin(!): 11.8 [AS]   0807 Troponin I High Sensitivity: <3 [AS]   0807 Magnesium : 2.0 [AS]   0807 BNP: 16 [AS]   0807 Potassium: 3.7 [AS]   0807 WBC: 5.23 [AS]   0807 Hemoglobin(!): 11.8 [AS]   1046 Troponin I High Sensitivity(!): 18 [AS]   1046 Repeat high sensitivity troponin mildly elevated.  Will place in observation for cardiac workup.  Patient updated. [AS]      ED Course User Index  [AS] Ruby Grande MD  [LP] Kobe Thornton III, MD                               Clinical Impression:  Final diagnoses:  [R07.9] Chest pain (Primary)  [R53.1] Weakness          ED Disposition Condition    Observation                       [1]   Social History  Tobacco Use    Smoking status: Never    Smokeless tobacco: Never   Substance Use Topics    Alcohol use: No    Drug use: No        Ruby Grande MD  07/23/25 1140

## 2025-07-23 NOTE — ED NOTES
Assumed care of the patient. Pt on continuous cardiac monitoring (tele: 3632). Pt in own clothing, side rails up X2, bed low and locked, and call light is placed within reach. No family/visitors at bedside at this time. Pt denies any complaints or needs.     Karli Jerez, a 60 y.o. female presents to the ED w/ complaint of chest pain     Triage note:  Chief Complaint   Patient presents with    Chest Pain     Pt states that she started to experience left sided chest pain at 3 AM. Pt describes pain as stabbing pain that comes and goes and rates as 5 out of 10.      Review of patient's allergies indicates:   Allergen Reactions    Pcn [penicillins] Anaphylaxis    Phenazopyridine Shortness Of Breath    Amoxicillin Swelling     Past Medical History:   Diagnosis Date    Acid reflux     Anemia

## 2025-07-23 NOTE — H&P
"ED Observation Unit  History and Physical      I assumed care of this patient from the Main ED at onset of observation time, 1114 on 07/23/2025.       History of Present Illness:    "60-year-old female, history of high cholesterol and hypertension complaining of generalized weakness and chest pain.  Patient reports that about 5 days ago she developed generalized fatigue.  Though symptoms have persisted.  Over the last couple days she had diarrhea and a queasiness in her stomach though no abdominal pain, no vomiting.  She feels like over this timeframe her blood pressure has been running low with a systolic in the 90s so she has held her antihypertensive.  She has had no fevers or chills.  She reports an 18 lb unintentional weight loss in the last month.  This morning around 3:00 a.m. she developed chest pain.  She describes it as a sticking pain in her left chest.  No associated diaphoresis shortness of breath or vomiting."    EDOU:   Patient clarifies some of the information in ED physician note.  She states that she HAS been trying to lose weight after finding out that she was prediabetic.  She has been exercising and has made some changes in her diet.  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  She feels that she has had some increased stress recently.  She has also had some diarrhea but states that it has not been a significant amount.  Has not eaten well over the past few days.  Her antihypertensive is low-dose of hydrochlorothiazide.   Her 2nd troponin increased from <3 to 18.  She was placed in observation for further cardiac testing given abnormal troponin and complaints of chest pain.  She denies chest pain currently, but reports some persistent but mild generalized weakness.      I reviewed the ED Provider Note dated 07/23/2025 prior to my " evaluation of this patient.  I reviewed all labs and imaging performed in the Main ED, prior to patient being placed in Observation. Patient was placed in the ED Observation Unit for Chest pain.    PMHx   Past Medical History:   Diagnosis Date    Acid reflux     Anemia       Past Surgical History:   Procedure Laterality Date    COLONOSCOPY      CYSTOSCOPY N/A 5/25/2018    Procedure: CYSTOSCOPY;  Surgeon: Talia Ponce MD;  Location: [facility];  Service: OB/GYN;  Laterality: N/A;    ESOPHAGOGASTRODUODENOSCOPY      HYSTERECTOMY  2018    LAPAROSCOPIC SALPINGECTOMY Bilateral 5/25/2018    Procedure: SALPINGECTOMY, LAPAROSCOPIC;  Surgeon: Talia Ponce MD;  Location: [facility];  Service: OB/GYN;  Laterality: Bilateral;    LAPAROSCOPIC TOTAL HYSTERECTOMY N/A 5/25/2018    Procedure: HYSTERECTOMY-TOTAL LAPAROSCOPIC (TLH);  Surgeon: Talia Ponce MD;  Location: [facility];  Service: OB/GYN;  Laterality: N/A;        Family Hx   Family History   Problem Relation Name Age of Onset    Breast cancer Neg Hx      Colon cancer Neg Hx      Ovarian cancer Neg Hx          Social Hx   Social History     Socioeconomic History    Marital status: Single   Tobacco Use    Smoking status: Never    Smokeless tobacco: Never   Substance and Sexual Activity    Alcohol use: No    Drug use: No    Sexual activity: Yes     Partners: Male     Birth control/protection: See Surgical Hx, None     Comment: Hysterectomy 5/25/18     Social Drivers of Health     Financial Resource Strain: Low Risk  (7/5/2025)    Received from [facility]    Overall Financial Resource Strain (CARDIA)     How hard is it for you to pay for the very basics like food, housing, medical care, and heating?: Not hard at all   Food Insecurity: No Food Insecurity (7/5/2025)    Received from [facility]    Hunger Vital Sign     Worried About Running Out of Food in the Last Year: Never true     Ran Out of Food in the Last Year: Never true   Transportation Needs: No  Transportation Needs (7/5/2025)    Received from [facility]    PRAPARE - Transportation     In the past 12 months, has lack of transportation kept you from medical appointments or from getting medications?: No     In the past 12 months, has lack of transportation kept you from meetings, work, or from getting things needed for daily living?: No   Physical Activity: Sufficiently Active (7/5/2025)    Received from [facility]    Exercise Vital Sign     Days of Exercise per Week: 5 days     Minutes of Exercise per Session: 60 min   Stress: No Stress Concern Present (7/5/2025)    Received from [facility]    [employer] - Occupational Stress Questionnaire     Do you feel stress - tense, restless, nervous, or anxious, or unable to sleep at night because your mind is troubled all the time - these days?: Only a little   Housing Stability: Unknown (7/5/2025)    Received from [facility]    Housing Stability Vital Sign     Unable to Pay for Housing in the Last Year: No        Vital Signs   Vitals:    07/23/25 0629 07/23/25 1121   BP: 119/67 117/75   BP Location: Right arm    Pulse: 75 (!) 56   Resp: 18 18   Temp: 98 °F (36.7 °C)    TempSrc: Oral    SpO2: 97% 98%        Review of Systems  Review of Systems   Constitutional:  Positive for malaise/fatigue.   Respiratory:  Negative for shortness of breath.    Cardiovascular:  Positive for chest pain (Resolved).   Gastrointestinal:  Negative for abdominal pain and vomiting.       Physical Exam  Physical Exam  Vitals reviewed.   Constitutional:       General: She is not in acute distress.     Appearance: She is not diaphoretic.   HENT:      Head: Normocephalic and atraumatic.   Cardiovascular:      Rate and Rhythm: Normal rate and regular rhythm.      Pulses:           Radial pulses are 2+ on the right side and 2+ on the left side.      Heart sounds: Heart sounds not distant. No murmur heard.     No friction rub. No gallop.   Pulmonary:      Effort:  Pulmonary effort is normal. No respiratory distress.      Breath sounds: Normal breath sounds. No decreased breath sounds, wheezing, rhonchi or rales.   Chest:      Chest wall: No tenderness.   Musculoskeletal:      Cervical back: Neck supple.      Right lower leg: No edema.      Left lower leg: No edema.   Skin:     General: Skin is warm and dry.   Neurological:      Mental Status: She is alert.   Psychiatric:         Mood and Affect: Mood and affect normal.         Medications:   Scheduled Meds:   [START ON 7/24/2025] aspirin  81 mg Oral Daily     Continuous Infusions:  PRN Meds:.  Current Facility-Administered Medications:     acetaminophen, 1,000 mg, Oral, Q8H PRN    melatonin, 6 mg, Oral, Nightly PRN    ondansetron, 4 mg, Intravenous, Q8H PRN    sodium chloride 0.9%, 10 mL, Intravenous, PRN      Assessment/Plan:  Chest pain  Generalized weakness    -patient has had mild diarrhea over the past couple of days.  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  Has not eaten well over the past few days, but has not had any vomiting.  Symptoms could be from dehydration.  -also suspect that recent hypotension could be from recent weight loss or dehydration  -500 cc bolus ordered  -exercise stress echo ordered  -2nd troponin was elevated but subsequent troponin normalized: <3 > 18 > <3  -tele  -holding patient's HCTZ for now; can resume if BP >130/90.  Can consider discontinuing indefinitely at discharge.    Case was discussed with the ED provider, Dr. Grande.

## 2025-07-24 ENCOUNTER — PATIENT OUTREACH (OUTPATIENT)
Dept: ADMINISTRATIVE | Facility: CLINIC | Age: 60
End: 2025-07-24
Payer: COMMERCIAL

## 2025-07-26 LAB — HOLD SPECIMEN: NORMAL

## 2025-07-28 ENCOUNTER — TELEPHONE (OUTPATIENT)
Dept: CARDIOLOGY | Facility: CLINIC | Age: 60
End: 2025-07-28
Payer: COMMERCIAL

## 2025-07-28 NOTE — TELEPHONE ENCOUNTER
Left message on voice mail    Copied from CRM #[id number]. Topic: Appointments - Appointment Rescheduling  >> Jul 25, 2025  4:14 PM Ousmane wrote:  Type:  Sooner Appointment Request    Patient is requesting a sooner appointment.  Patient declined first available appointment listed as well as another facility and provider .  Patient will not accept being placed on the waitlist and is requesting a message be sent to doctor.    Name of Caller:  patient     When is the first available appointment? 9/10    Symptoms:  establish care / hospital follow up 2 weeks     Would the patient rather a call back or a response via [facility]?  Call     Best Call Back Number: [phone]     Additional Information:  pt is requesting a sooner appointment  >> Jul 25, 2025  4:38 PM YAZ Rush wrote:

## 2025-07-28 NOTE — TELEPHONE ENCOUNTER
Spoke to pt- appt moved to 8/19/25.    Copied from CRM #[id number]. Topic: General Inquiry - Return Call  >> Jul 28, 2025  3:47 PM Una wrote:  Type:  Patient Returning Call    Who Called: self    Who Left Message for Patient: Mariely Chau MA    Does the patient know what this is regarding?:getting a sooner appt    Would the patient rather a call back or a response via My [facility]? call    Best Call Back Number:[phone]      Additional Information:

## 2025-07-31 ENCOUNTER — OFFICE VISIT (OUTPATIENT)
Dept: INTERNAL MEDICINE | Facility: CLINIC | Age: 60
End: 2025-07-31
Payer: COMMERCIAL

## 2025-07-31 ENCOUNTER — LAB VISIT (OUTPATIENT)
Dept: LAB | Facility: HOSPITAL | Age: 60
End: 2025-07-31
Payer: COMMERCIAL

## 2025-07-31 VITALS
WEIGHT: 175.5 LBS | DIASTOLIC BLOOD PRESSURE: 74 MMHG | BODY MASS INDEX: 28.32 KG/M2 | HEART RATE: 76 BPM | SYSTOLIC BLOOD PRESSURE: 110 MMHG

## 2025-07-31 DIAGNOSIS — R53.83 FATIGUE, UNSPECIFIED TYPE: ICD-10-CM

## 2025-07-31 DIAGNOSIS — D64.9 ANEMIA, UNSPECIFIED TYPE: Primary | ICD-10-CM

## 2025-07-31 DIAGNOSIS — R22.1 LUMP IN NECK: ICD-10-CM

## 2025-07-31 DIAGNOSIS — D64.9 ANEMIA, UNSPECIFIED TYPE: ICD-10-CM

## 2025-07-31 DIAGNOSIS — D50.8 IRON DEFICIENCY ANEMIA SECONDARY TO INADEQUATE DIETARY IRON INTAKE: ICD-10-CM

## 2025-07-31 LAB
IRON SATN MFR SERPL: 12 % (ref 20–50)
IRON SERPL-MCNC: 37 UG/DL (ref 30–160)
TIBC SERPL-MCNC: 317 UG/DL (ref 250–450)
TRANSFERRIN SERPL-MCNC: 214 MG/DL (ref 200–375)

## 2025-07-31 PROCEDURE — 84466 ASSAY OF TRANSFERRIN: CPT

## 2025-07-31 PROCEDURE — 99999 PR PBB SHADOW E&M-EST. PATIENT-LVL III: CPT | Mod: PBBFAC,,,

## 2025-07-31 PROCEDURE — 36415 COLL VENOUS BLD VENIPUNCTURE: CPT

## 2025-07-31 NOTE — PROGRESS NOTES
Karli Jerez  1965        Subjective     Reason for Visit: lump on right clavicle     History of Present Illness:  Ms. Karli Jerez is a 60 y.o. female with a history of a hysterectomy in 2018 who presents to clinic for a lump on her R clavicle bone. She has noticed this since December 2024. The lump is not painful but she feels weak after touching it. 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 She reports new weakness this week that made her want to get checked out. She does not eat much red meat. She is tired all day and on the job. She feels weakest on her R arm. She has noticed a 25 lb unintentional weight loss since May 2025. She denies night sweats or chills. She has been watching what she eats because of her A1C. Normal appetite. She hasn't been on a strict diet, she just has tried to adhere to a diabetic diet. She has been constipated all her life and has a BM 3-4x/week. No blood in her stool noticed. Most recent mammogram in 2023 was normal. Last seen by OBGYN in 2024. Her last colonoscopy was 2019 and was supposed to have 5 year follow up. Strong history of colon cancer in her family. Maternal grandmother (~89 yo) and mother (dx at 82 yo) both had colon cancer. Three uncles passed away from colon cancer (diagnosed at ~31 yo and 49 yo). Recently seen in the ED for chest pain with negative workup. She reports only taking ASA 81mg for palpitations. She does not take Hctz. No chest pain reported today.         Review of Systems   Constitutional:  Positive for malaise/fatigue and weight loss. Negative for chills, diaphoresis and fever.   Eyes:  Negative for blurred vision, double vision and pain.   Respiratory:  Negative for shortness of breath and wheezing.    Cardiovascular:  Negative for chest pain.   Gastrointestinal:  Negative for abdominal pain, blood in stool, diarrhea, nausea and vomiting.   Musculoskeletal:  Negative for myalgias.        PAST HISTORY:     Past Medical  History:   Diagnosis Date    Acid reflux     Anemia        Past Surgical History:   Procedure Laterality Date    COLONOSCOPY      CYSTOSCOPY N/A 5/25/2018    Procedure: CYSTOSCOPY;  Surgeon: Talia Ponce MD;  Location: [facility] OR;  Service: OB/GYN;  Laterality: N/A;    ESOPHAGOGASTRODUODENOSCOPY      HYSTERECTOMY  2018    LAPAROSCOPIC SALPINGECTOMY Bilateral 5/25/2018    Procedure: SALPINGECTOMY, LAPAROSCOPIC;  Surgeon: Talia Ponce MD;  Location: [facility] OR;  Service: OB/GYN;  Laterality: Bilateral;    LAPAROSCOPIC TOTAL HYSTERECTOMY N/A 5/25/2018    Procedure: HYSTERECTOMY-TOTAL LAPAROSCOPIC (TLH);  Surgeon: Talia Ponce MD;  Location: [facility] OR;  Service: OB/GYN;  Laterality: N/A;       Family History   Problem Relation Name Age of Onset    Breast cancer Neg Hx      Colon cancer Neg Hx      Ovarian cancer Neg Hx         Social History     Socioeconomic History    Marital status: Single   Tobacco Use    Smoking status: Never    Smokeless tobacco: Never   Substance and Sexual Activity    Alcohol use: No    Drug use: No    Sexual activity: Yes     Partners: Male     Birth control/protection: See Surgical Hx, None     Comment: Hysterectomy 5/25/18     Social Drivers of Health     Financial Resource Strain: Low Risk  (7/5/2025)    Received from [facility]    Overall Financial Resource Strain (CARDIA)     How hard is it for you to pay for the very basics like food, housing, medical care, and heating?: Not hard at all   Food Insecurity: No Food Insecurity (7/5/2025)    Received from [facility]    Hunger Vital Sign     Worried About Running Out of Food in the Last Year: Never true     Ran Out of Food in the Last Year: Never true   Transportation Needs: No Transportation Needs (7/5/2025)    Received from [facility]    PRAPARE - Transportation     In the past 12 months, has lack of transportation kept you from medical appointments or from getting medications?: No     In the past 12 months, has lack of  transportation kept you from meetings, work, or from getting things needed for daily living?: No   Physical Activity: Sufficiently Active (7/5/2025)    Received from [facility]    Exercise Vital Sign     Days of Exercise per Week: 5 days     Minutes of Exercise per Session: 60 min   Stress: No Stress Concern Present (7/5/2025)    Received from [facility]    [employer] - Occupational Stress Questionnaire     Do you feel stress - tense, restless, nervous, or anxious, or unable to sleep at night because your mind is troubled all the time - these days?: Only a little   Housing Stability: Unknown (7/5/2025)    Received from [facility]    Housing Stability Vital Sign     Unable to Pay for Housing in the Last Year: No       MEDICATIONS & ALLERGIES:     Current Outpatient Medications on File Prior to Visit   Medication Sig    aspirin (ECOTRIN) 81 MG EC tablet Take 1 tablet by mouth once daily.    EPINEPHrine (EPIPEN) 0.3 mg/0.3 mL AtIn Inject 0.3 mg into the muscle.    esomeprazole (NEXIUM) 40 MG capsule Take 40 mg by mouth before breakfast.    hydroCHLOROthiazide 12.5 MG Tab Take 12.5 mg by mouth once daily.    naproxen (NAPROSYN) 500 MG tablet Take 500 mg by mouth 2 (two) times daily.     No current facility-administered medications on file prior to visit.       Review of patient's allergies indicates:   Allergen Reactions    Penicillins Anaphylaxis     Other Reaction(s): anaphylaxis    Phenazopyridine Shortness Of Breath     Other Reaction(s): shortness of breath    Amoxicillin Swelling       OBJECTIVE:        Vital Signs:  Vitals:    07/31/25 1256   BP: 110/74   Pulse: 76   Weight: 79.6 kg (175 lb 7.8 oz)       Body mass index is 28.32 kg/m².     Physical Exam:  Physical Exam  Constitutional:       General: She is not in acute distress.     Appearance: She is not toxic-appearing.   HENT:      Head: Normocephalic and atraumatic.   Eyes:      Extraocular Movements: Extraocular movements  "intact.   Neck:      Comments: No lymphadenopathy appreciated at the head, neck or axilla.     <1 cm fibrous area palpated at R medial clavicle (felt similar to a ligament)  Cardiovascular:      Rate and Rhythm: Normal rate and regular rhythm.   Pulmonary:      Effort: Pulmonary effort is normal. No respiratory distress.      Breath sounds: Normal breath sounds. No wheezing or rales.   Abdominal:      General: There is no distension.      Palpations: Abdomen is soft.   Lymphadenopathy:      Cervical: No cervical adenopathy.   Neurological:      Mental Status: She is alert. Mental status is at baseline.   Psychiatric:         Mood and Affect: Mood normal.          Laboratory  Lab Results   Component Value Date    WBC 5.23 07/23/2025    HGB 11.8 (L) 07/23/2025    HCT 34.0 (L) 07/23/2025    MCV 81 (L) 07/23/2025     07/23/2025     Lab Results   Component Value Date    GLU 92 07/23/2025     07/23/2025    K 3.7 07/23/2025     07/23/2025    CO2 25 07/23/2025    BUN 8 07/23/2025    CREATININE 0.6 07/23/2025    CALCIUM 9.0 07/23/2025    MG 2.0 07/23/2025    PROT 7.0 07/23/2025    BILITOT 0.4 07/23/2025    ALKPHOS 93 07/23/2025    ALT 14 07/23/2025    AST 16 07/23/2025     Lab Results   Component Value Date    INR 1.1 10/03/2023    INR 1.2 05/22/2021    INR 1.0 01/29/2018     Lab Results   Component Value Date    CHOL 186 07/10/2025    HDL 45 (L) 07/10/2025    LDLCALC 127 (H) 07/10/2025    TRIG 47 07/10/2025     Lab Results   Component Value Date    HGBA1C 5.6 11/18/2024     Lab Results   Component Value Date    TSH 1.601 07/23/2025     No results for input(s): "POCTGLUCOSE" in the last 72 hours.       Health Maintenance         Date Due Completion Date    TETANUS VACCINE Never done ---    Colorectal Cancer Screening Never done ---    Shingles Vaccine (1 of 2) Never done ---    Pneumococcal Vaccines (Age 50+) (1 of 1 - PCV) Never done ---    COVID-19 Vaccine (2 - 2024-25 season) 09/01/2024 8/21/2021    " Mammogram 11/08/2024 11/8/2023    Influenza Vaccine (1) 09/01/2025 11/1/2019    Hemoglobin A1c (Diabetic Prevention Screening) 07/10/2028 7/10/2025    Lipid Panel 07/10/2030 7/10/2025    RSV Vaccine (Age 60+ and Pregnant patients) (1 - 1-dose 75+ series) 04/29/2042 ---                ASSESSMENT & PLAN:       Ms. Karli Jerez is a 60 y.o. female who was seen today in clinic for a lump on her right clavicle. Iron sat was low which is likely contributing to fatigue. Recommend repletion with iron tablets. She reports that she does not like eating red meat. RON could be 2/2 to diet but she is also seeing GI soon for a colonoscopy. Lump on mass was almost undetectable on physical exam and too small to be appreciated on ultrasound. It felt fibrous in texture and is most likely a ligament. Recommended monitoring the site with either myself or her PCP. No night sweats, chills, or lymphadenopathy appreciated. Patient has also been on a new diabetic diet recently that could have contributed to her weight loss. Recommend staying up to date on her cancer screenings (colonoscopy, mammogram). She was encouraged to see myself or her PCP again in clinic if sx continue.     Diagnoses and all orders for this visit:    Anemia, unspecified type  -     Iron and TIBC; Future  -     Methylmalonic Acid, Serum; Future  -     Methylmalonic Acid, Serum    Lump in neck    Fatigue, unspecified type         1. Anemia, unspecified type    2. Lump in neck    3. Fatigue, unspecified type        No follow-ups on file.    Other Orders Placed This Visit:  Orders Placed This Encounter   Procedures    Iron and TIBC    Methylmalonic Acid, Serum               Discussed with Dr. Steward - staff attestation to follow    Kimberlee Hernandez DO  Internal Medicine, PGY-3  08/01/2025.12:56 PM  [facility]  Internal Medicine Resident Clinic  [address] [place] [id number]  [phone]  [email or web address]

## 2025-07-31 NOTE — PATIENT INSTRUCTIONS
You were seen today for a lump near your right clavicle and anemia.    Iron studies and Vitamin B12 level to evaluate your anemia.   Recommend monitoring the area and reach out to our office or your PCP if you develop night sweats, chills or other symptoms.    The nodule is too small to be seen by ultrasound. Recommend continuing with your cancer screenings (colonoscopy and mammogram) this year.

## 2025-08-01 RX ORDER — FERROUS SULFATE 325(65) MG
325 TABLET ORAL
Qty: 90 TABLET | Refills: 1 | Status: SHIPPED | OUTPATIENT
Start: 2025-08-01

## 2025-08-19 ENCOUNTER — OFFICE VISIT (OUTPATIENT)
Dept: CARDIOLOGY | Facility: CLINIC | Age: 60
End: 2025-08-19
Attending: INTERNAL MEDICINE
Payer: COMMERCIAL

## 2025-08-19 VITALS
HEIGHT: 66 IN | HEART RATE: 70 BPM | OXYGEN SATURATION: 97 % | SYSTOLIC BLOOD PRESSURE: 132 MMHG | WEIGHT: 178.38 LBS | BODY MASS INDEX: 28.67 KG/M2 | DIASTOLIC BLOOD PRESSURE: 64 MMHG

## 2025-08-19 DIAGNOSIS — I10 PRIMARY HYPERTENSION: ICD-10-CM

## 2025-08-19 DIAGNOSIS — Z87.898 HISTORY OF CHEST PAIN: ICD-10-CM

## 2025-08-19 DIAGNOSIS — E66.3 OVERWEIGHT: ICD-10-CM

## 2025-08-19 PROCEDURE — 99999 PR PBB SHADOW E&M-EST. PATIENT-LVL IV: CPT | Mod: PBBFAC,,, | Performed by: INTERNAL MEDICINE

## 2025-08-19 PROCEDURE — 99204 OFFICE O/P NEW MOD 45 MIN: CPT | Mod: S$GLB,,, | Performed by: INTERNAL MEDICINE

## 2025-08-19 PROCEDURE — 3078F DIAST BP <80 MM HG: CPT | Mod: CPTII,S$GLB,, | Performed by: INTERNAL MEDICINE

## 2025-08-19 PROCEDURE — 1159F MED LIST DOCD IN RCRD: CPT | Mod: CPTII,S$GLB,, | Performed by: INTERNAL MEDICINE

## 2025-08-19 PROCEDURE — 3075F SYST BP GE 130 - 139MM HG: CPT | Mod: CPTII,S$GLB,, | Performed by: INTERNAL MEDICINE

## 2025-08-19 PROCEDURE — 3008F BODY MASS INDEX DOCD: CPT | Mod: CPTII,S$GLB,, | Performed by: INTERNAL MEDICINE

## (undated) DEVICE — FOOTSWITCH SUCTION IRRIGATION

## (undated) DEVICE — FORCEP 5MM BIPOLAR CUT EVEREST

## (undated) DEVICE — TIP RUMI KOH-EFFICIENT 4.0

## (undated) DEVICE — GLOVE BIOGEL SKINSENSE PI 7.0

## (undated) DEVICE — KIT WING PAD POSITIONING

## (undated) DEVICE — NDL INSUF ULTRA VERESS 120MM

## (undated) DEVICE — SYR 10CC LUER LOCK

## (undated) DEVICE — TROCAR ENDOPATH XCEL 5X100MM

## (undated) DEVICE — KIT ANTIFOG

## (undated) DEVICE — ELECTRODE ELECSURG LAPSCP SPAT

## (undated) DEVICE — SOL CLEARIFY VISUALIZATION LAP

## (undated) DEVICE — SUT MCRYL PLUS 4-0 PS2 27IN

## (undated) DEVICE — DRAPE STERI LONG

## (undated) DEVICE — SOL 9P NACL IRR PIC IL

## (undated) DEVICE — SOL ELECTROLUBE ANTI-STIC

## (undated) DEVICE — ELECTRODE REM PLYHSV RETURN 9

## (undated) DEVICE — PACK LAPAROSCOPY BAPTIST

## (undated) DEVICE — TROCAR ENDOPATH XCEL 11MM 10CM

## (undated) DEVICE — SEE MEDLINE ITEM 156930

## (undated) DEVICE — SEE MEDLINE ITEM 152622

## (undated) DEVICE — SEE MEDLINE ITEM 157117

## (undated) DEVICE — SCISSOR 5MMX35CM DIRECT DRIVE

## (undated) DEVICE — JELLY KY LUBRICATING 5G PACKET

## (undated) DEVICE — SUT VICRYL PLUS 0 CT1 36IN

## (undated) DEVICE — TIP RUMI GREEN DISPOSABLE6.7MM

## (undated) DEVICE — GLOVE BIOGEL SKINSENSE PI 6.5

## (undated) DEVICE — IRRIGATOR ENDOSCOPY DISP.

## (undated) DEVICE — SYR 50CC LL

## (undated) DEVICE — CANNULA ENDOPATH XCEL 5X100MM

## (undated) DEVICE — SPONGE LAP 18X18 PREWASHED